# Patient Record
Sex: FEMALE | Race: WHITE | NOT HISPANIC OR LATINO
[De-identification: names, ages, dates, MRNs, and addresses within clinical notes are randomized per-mention and may not be internally consistent; named-entity substitution may affect disease eponyms.]

---

## 2017-05-14 PROBLEM — Z00.00 ENCOUNTER FOR PREVENTIVE HEALTH EXAMINATION: Status: ACTIVE | Noted: 2017-05-14

## 2017-06-12 ENCOUNTER — APPOINTMENT (OUTPATIENT)
Dept: NEUROLOGY | Facility: CLINIC | Age: 82
End: 2017-06-12

## 2017-06-12 VITALS
HEIGHT: 69 IN | BODY MASS INDEX: 22.22 KG/M2 | DIASTOLIC BLOOD PRESSURE: 80 MMHG | HEART RATE: 74 BPM | WEIGHT: 150 LBS | SYSTOLIC BLOOD PRESSURE: 113 MMHG

## 2017-06-12 DIAGNOSIS — E03.9 HYPOTHYROIDISM, UNSPECIFIED: ICD-10-CM

## 2017-06-12 DIAGNOSIS — M54.5 LOW BACK PAIN: ICD-10-CM

## 2017-06-12 DIAGNOSIS — Z82.49 FAMILY HISTORY OF ISCHEMIC HEART DISEASE AND OTHER DISEASES OF THE CIRCULATORY SYSTEM: ICD-10-CM

## 2017-06-12 DIAGNOSIS — G62.9 POLYNEUROPATHY, UNSPECIFIED: ICD-10-CM

## 2017-06-12 DIAGNOSIS — Z78.9 OTHER SPECIFIED HEALTH STATUS: ICD-10-CM

## 2017-06-12 DIAGNOSIS — Z80.9 FAMILY HISTORY OF MALIGNANT NEOPLASM, UNSPECIFIED: ICD-10-CM

## 2017-07-25 ENCOUNTER — EMERGENCY (EMERGENCY)
Facility: HOSPITAL | Age: 82
LOS: 1 days | End: 2017-07-25
Payer: MEDICARE

## 2017-07-25 PROCEDURE — 99284 EMERGENCY DEPT VISIT MOD MDM: CPT | Mod: GC

## 2017-07-28 ENCOUNTER — OUTPATIENT (OUTPATIENT)
Dept: OUTPATIENT SERVICES | Facility: HOSPITAL | Age: 82
LOS: 1 days | End: 2017-07-28

## 2017-08-29 ENCOUNTER — APPOINTMENT (OUTPATIENT)
Dept: NEUROLOGY | Facility: CLINIC | Age: 82
End: 2017-08-29

## 2019-02-12 ENCOUNTER — EMERGENCY (EMERGENCY)
Facility: HOSPITAL | Age: 84
LOS: 1 days | End: 2019-02-12
Admitting: EMERGENCY MEDICINE
Payer: MEDICARE

## 2019-02-12 PROCEDURE — 71045 X-RAY EXAM CHEST 1 VIEW: CPT | Mod: 26

## 2019-02-12 PROCEDURE — 74019 RADEX ABDOMEN 2 VIEWS: CPT | Mod: 26

## 2019-02-12 PROCEDURE — 99285 EMERGENCY DEPT VISIT HI MDM: CPT | Mod: GC

## 2019-02-12 PROCEDURE — 93010 ELECTROCARDIOGRAM REPORT: CPT

## 2019-03-29 ENCOUNTER — EMERGENCY (EMERGENCY)
Facility: HOSPITAL | Age: 84
LOS: 1 days | End: 2019-03-29
Admitting: EMERGENCY MEDICINE
Payer: MEDICARE

## 2019-03-29 PROCEDURE — 70450 CT HEAD/BRAIN W/O DYE: CPT | Mod: 26

## 2019-03-29 PROCEDURE — 93010 ELECTROCARDIOGRAM REPORT: CPT

## 2019-03-29 PROCEDURE — 99285 EMERGENCY DEPT VISIT HI MDM: CPT

## 2019-03-29 PROCEDURE — 71045 X-RAY EXAM CHEST 1 VIEW: CPT | Mod: 26

## 2019-06-02 ENCOUNTER — INPATIENT (INPATIENT)
Facility: HOSPITAL | Age: 84
LOS: 6 days | Discharge: HOME HEALTH | End: 2019-06-09
Attending: INTERNAL MEDICINE | Admitting: INTERNAL MEDICINE
Payer: MEDICARE

## 2019-06-02 VITALS — HEIGHT: 69 IN | WEIGHT: 149.91 LBS

## 2019-06-02 DIAGNOSIS — R74.8 ABNORMAL LEVELS OF OTHER SERUM ENZYMES: ICD-10-CM

## 2019-06-02 DIAGNOSIS — I50.9 HEART FAILURE, UNSPECIFIED: ICD-10-CM

## 2019-06-02 DIAGNOSIS — I10 ESSENTIAL (PRIMARY) HYPERTENSION: ICD-10-CM

## 2019-06-02 DIAGNOSIS — E87.1 HYPO-OSMOLALITY AND HYPONATREMIA: ICD-10-CM

## 2019-06-02 LAB
ALBUMIN SERPL ELPH-MCNC: 3.1 G/DL — LOW (ref 3.3–5)
ALP SERPL-CCNC: 435 U/L — HIGH (ref 40–120)
ALT FLD-CCNC: 39 U/L — SIGNIFICANT CHANGE UP (ref 12–78)
ANION GAP SERPL CALC-SCNC: 9 MMOL/L — SIGNIFICANT CHANGE UP (ref 5–17)
APPEARANCE UR: CLEAR — SIGNIFICANT CHANGE UP
AST SERPL-CCNC: 23 U/L — SIGNIFICANT CHANGE UP (ref 15–37)
BASOPHILS # BLD AUTO: 0 K/UL — SIGNIFICANT CHANGE UP (ref 0–0.2)
BASOPHILS NFR BLD AUTO: 0 % — SIGNIFICANT CHANGE UP (ref 0–2)
BILIRUB SERPL-MCNC: 0.6 MG/DL — SIGNIFICANT CHANGE UP (ref 0.2–1.2)
BILIRUB UR-MCNC: NEGATIVE — SIGNIFICANT CHANGE UP
BUN SERPL-MCNC: 8 MG/DL — SIGNIFICANT CHANGE UP (ref 7–23)
CALCIUM SERPL-MCNC: 8.8 MG/DL — SIGNIFICANT CHANGE UP (ref 8.5–10.1)
CHLORIDE SERPL-SCNC: 85 MMOL/L — LOW (ref 96–108)
CO2 SERPL-SCNC: 29 MMOL/L — SIGNIFICANT CHANGE UP (ref 22–31)
COLOR SPEC: YELLOW — SIGNIFICANT CHANGE UP
CREAT SERPL-MCNC: 0.59 MG/DL — SIGNIFICANT CHANGE UP (ref 0.5–1.3)
DIFF PNL FLD: NEGATIVE — SIGNIFICANT CHANGE UP
EOSINOPHIL # BLD AUTO: 0 K/UL — SIGNIFICANT CHANGE UP (ref 0–0.5)
EOSINOPHIL NFR BLD AUTO: 0 % — SIGNIFICANT CHANGE UP (ref 0–6)
GLUCOSE SERPL-MCNC: 119 MG/DL — HIGH (ref 70–99)
GLUCOSE UR QL: NEGATIVE MG/DL — SIGNIFICANT CHANGE UP
HCT VFR BLD CALC: 37.5 % — SIGNIFICANT CHANGE UP (ref 34.5–45)
HGB BLD-MCNC: 13 G/DL — SIGNIFICANT CHANGE UP (ref 11.5–15.5)
KETONES UR-MCNC: NEGATIVE — SIGNIFICANT CHANGE UP
LEUKOCYTE ESTERASE UR-ACNC: ABNORMAL
LYMPHOCYTES # BLD AUTO: 1.46 K/UL — SIGNIFICANT CHANGE UP (ref 1–3.3)
LYMPHOCYTES # BLD AUTO: 13 % — SIGNIFICANT CHANGE UP (ref 13–44)
MCHC RBC-ENTMCNC: 31.7 PG — SIGNIFICANT CHANGE UP (ref 27–34)
MCHC RBC-ENTMCNC: 34.7 GM/DL — SIGNIFICANT CHANGE UP (ref 32–36)
MCV RBC AUTO: 91.5 FL — SIGNIFICANT CHANGE UP (ref 80–100)
MONOCYTES # BLD AUTO: 0.78 K/UL — SIGNIFICANT CHANGE UP (ref 0–0.9)
MONOCYTES NFR BLD AUTO: 7 % — SIGNIFICANT CHANGE UP (ref 2–14)
NEUTROPHILS # BLD AUTO: 8.97 K/UL — HIGH (ref 1.8–7.4)
NEUTROPHILS NFR BLD AUTO: 80 % — HIGH (ref 43–77)
NITRITE UR-MCNC: NEGATIVE — SIGNIFICANT CHANGE UP
NRBC # BLD: SIGNIFICANT CHANGE UP /100 WBCS (ref 0–0)
NT-PROBNP SERPL-SCNC: 1855 PG/ML — HIGH (ref 0–450)
PH UR: 7 — SIGNIFICANT CHANGE UP (ref 5–8)
PLATELET # BLD AUTO: 593 K/UL — HIGH (ref 150–400)
POTASSIUM SERPL-MCNC: 3.5 MMOL/L — SIGNIFICANT CHANGE UP (ref 3.5–5.3)
POTASSIUM SERPL-SCNC: 3.5 MMOL/L — SIGNIFICANT CHANGE UP (ref 3.5–5.3)
PROT SERPL-MCNC: 7.1 GM/DL — SIGNIFICANT CHANGE UP (ref 6–8.3)
PROT UR-MCNC: NEGATIVE MG/DL — SIGNIFICANT CHANGE UP
RBC # BLD: 4.1 M/UL — SIGNIFICANT CHANGE UP (ref 3.8–5.2)
RBC # FLD: 12.2 % — SIGNIFICANT CHANGE UP (ref 10.3–14.5)
SODIUM SERPL-SCNC: 123 MMOL/L — LOW (ref 135–145)
SP GR SPEC: 1.01 — SIGNIFICANT CHANGE UP (ref 1.01–1.02)
TROPONIN I SERPL-MCNC: 0.03 NG/ML — SIGNIFICANT CHANGE UP (ref 0.01–0.04)
TROPONIN I SERPL-MCNC: 0.04 NG/ML — SIGNIFICANT CHANGE UP (ref 0.01–0.04)
UROBILINOGEN FLD QL: NEGATIVE MG/DL — SIGNIFICANT CHANGE UP
WBC # BLD: 11.21 K/UL — HIGH (ref 3.8–10.5)
WBC # FLD AUTO: 11.21 K/UL — HIGH (ref 3.8–10.5)

## 2019-06-02 PROCEDURE — 71045 X-RAY EXAM CHEST 1 VIEW: CPT | Mod: 26

## 2019-06-02 PROCEDURE — 70450 CT HEAD/BRAIN W/O DYE: CPT | Mod: 26

## 2019-06-02 PROCEDURE — 93010 ELECTROCARDIOGRAM REPORT: CPT

## 2019-06-02 PROCEDURE — 99285 EMERGENCY DEPT VISIT HI MDM: CPT

## 2019-06-02 RX ORDER — HEPARIN SODIUM 5000 [USP'U]/ML
5000 INJECTION INTRAVENOUS; SUBCUTANEOUS EVERY 12 HOURS
Refills: 0 | Status: DISCONTINUED | OUTPATIENT
Start: 2019-06-02 | End: 2019-06-09

## 2019-06-02 RX ORDER — LANOLIN ALCOHOL/MO/W.PET/CERES
5 CREAM (GRAM) TOPICAL AT BEDTIME
Refills: 0 | Status: COMPLETED | OUTPATIENT
Start: 2019-06-02 | End: 2019-06-02

## 2019-06-02 RX ORDER — FUROSEMIDE 40 MG
40 TABLET ORAL ONCE
Refills: 0 | Status: COMPLETED | OUTPATIENT
Start: 2019-06-02 | End: 2019-06-02

## 2019-06-02 RX ORDER — METOPROLOL TARTRATE 50 MG
12.5 TABLET ORAL EVERY 12 HOURS
Refills: 0 | Status: DISCONTINUED | OUTPATIENT
Start: 2019-06-02 | End: 2019-06-02

## 2019-06-02 RX ORDER — FUROSEMIDE 40 MG
40 TABLET ORAL EVERY 12 HOURS
Refills: 0 | Status: DISCONTINUED | OUTPATIENT
Start: 2019-06-02 | End: 2019-06-06

## 2019-06-02 RX ORDER — METOPROLOL TARTRATE 50 MG
25 TABLET ORAL EVERY 8 HOURS
Refills: 0 | Status: DISCONTINUED | OUTPATIENT
Start: 2019-06-02 | End: 2019-06-09

## 2019-06-02 RX ORDER — LISINOPRIL 2.5 MG/1
2.5 TABLET ORAL DAILY
Refills: 0 | Status: DISCONTINUED | OUTPATIENT
Start: 2019-06-02 | End: 2019-06-06

## 2019-06-02 RX ADMIN — Medication 40 MILLIGRAM(S): at 15:03

## 2019-06-02 RX ADMIN — Medication 5 MILLIGRAM(S): at 23:04

## 2019-06-02 RX ADMIN — Medication 25 MILLIGRAM(S): at 21:14

## 2019-06-02 NOTE — ED PROVIDER NOTE - OBJECTIVE STATEMENT
85 y/o F with no reported PMH, states she was recently started on lasix 5 days ago, presents with bilateral LE edema, non-productive cough, CP worse with coughing x months. pt states she was seen at [facility] 3x over the past 2 months for similar symptoms. States she signed out AMA following admission on most recent visit. Pt is accompanied by son who states he found her on the floor in her garage today. Pt confirms she fell into her laundry basket while in the garage. Believes she hit her head on the wall. No LOC. States most recent echo was 1 year ago which is self-reported as normal. States most recent stress test was 85 y/o F with no reported PMH, states she was recently started on lasix 5 days ago, presents with bilateral LE edema, non-productive cough, CP worse with coughing x months. pt states she was seen at [facility] 3x over the past 2 months for similar symptoms. States she signed out AMA following admission on most recent visit. Pt is accompanied by son who states he found her on the floor in her garage today. Pt confirms she tripped & fell into her laundry basket while in the garage. Believes she hit her head on the wall. No LOC. States most recent echo was 1 year ago which is self-reported as normal. States most recent stress test was > 2 years ago which was normal. Denies blurry vision, HA, dizziness, nausea, vomiting, SOB, palpitations, abdominal pain, abdominal distension, recent travel/sick contacts.

## 2019-06-02 NOTE — ED PROVIDER NOTE - CLINICAL SUMMARY MEDICAL DECISION MAKING FREE TEXT BOX
80 y/o F with no reported PMH, recently started on lasix has symptoms consistent with CHF. Also had recent fall today. Will assess Head CT, labs, CXR, EKG, provide lasix. Likely admission. 80 y/o F with no reported PMH, recently started on lasix has symptoms consistent with CHF. Also had recent fall today. Will assess Head CT, labs, CXR, EKG, provide lasix. Likely admission. Elevated Pro-BNP, mild elevation in troponin. Lasix administered. Plan for admission.

## 2019-06-02 NOTE — H&P ADULT - PROBLEM SELECTOR PLAN 1
- strict ins and outs  - daily weights  - lasix 40 mg IVP Q 12 hours  - lopressor 25 mg PO Q 8 hours with holds for SBP <110 or HR < 60bpm  - TTE in am  - Cardiology eval

## 2019-06-02 NOTE — H&P ADULT - HISTORY OF PRESENT ILLNESS
85 y/o female with PMHx of HTN and allergic rhinitis that presents to the ED with complaints of  progressive shortness of breath and lower extremity swelling.  Patient states that these symptoms have been worsening over the last 1 month.  She states that she was seen by a Cardiologist at [facility] and was told that she has "atrial enlargement"  She states that she has been placed on Lasix but has not taken the medication due to urticaria.  Patient denies fevers and chills but does complain of chest pain with dry cough which she has had over the last 3 weeks.  Denies other symptoms.    ED course  - patient found to have elevated BNP with CXR findings significant for left pleural effusion and right infiltrate   BP elevated in ED.  Given 40 lasix IVP

## 2019-06-02 NOTE — ED PROVIDER NOTE - ATTENDING CONTRIBUTION TO CARE
I personally saw the patient with the ACP, and completed the key components of the history and physical exam.  I then discussed the management plan with the ACP.     84F recently started on PO Lasix (?CHF) c/o dry cough, leg swelling.  No chest pain.  Fell this am -- found on floor by son.  Plan: CT head, CXR, labs, lasix, likely admit

## 2019-06-02 NOTE — ED ADULT NURSE NOTE - OBJECTIVE STATEMENT
Pt brought to the ED by son after son found her on the ground in the garage today. Pt states that she has been in [facility] several time over the past couple of months. Pt states that today she was walking in the garage and she believes that she slipped and fell. Pt denies any LOC. Pt admits to hitting the back of her head when she fell. Pt complains of dry cough and chest discomfort that she attributes to the cough. Pt states that she started lasix this week for lower extremity swelling which she says has been working.

## 2019-06-02 NOTE — ED PROVIDER NOTE - CARE PLAN
Principal Discharge DX:	Congestive heart failure, unspecified HF chronicity, unspecified heart failure type  Secondary Diagnosis:	Concussion without loss of consciousness, initial encounter

## 2019-06-02 NOTE — ED ADULT TRIAGE NOTE - CHIEF COMPLAINT QUOTE
c/o cough and chest pain for past 2 weeks, c/o bilateral lower extremities swelling noted, O2 sat in triage 91% on RA, pulse 104

## 2019-06-02 NOTE — PATIENT PROFILE ADULT - STATED REASON FOR ADMISSION
I have been coughing for 3 weeks 1 went to [facility] 3x  and they sent me to an allergist and the spray he gave me didn't help my cough at all. my son found me sitting on floor and said you need to go to hospital.

## 2019-06-02 NOTE — ED ADULT NURSE NOTE - NSFALLRSKHMRSKTYOTFT_ED_ALL_ED
The patient completed a Confidential Clinical Questionnaire.  I sent the original to scanning and held a copy in Psychiatry until scanning complete/confirmed.Jenni Valle/YIMI    
pitting edema; shortness of breath

## 2019-06-02 NOTE — ED ADULT NURSE NOTE - NSIMPLEMENTINTERV_GEN_ALL_ED
Implemented All Fall with Harm Risk Interventions:  [place] to call system. Call bell, personal items and telephone within reach. Instruct patient to call for assistance. Room bathroom lighting operational. Non-slip footwear when patient is off stretcher. Physically safe environment: no spills, clutter or unnecessary equipment. Stretcher in lowest position, wheels locked, appropriate side rails in place. Provide visual cue, wrist band, yellow gown, etc. Monitor gait and stability. Monitor for mental status changes and reorient to person, place, and time. Review medications for side effects contributing to fall risk. Reinforce activity limits and safety measures with patient and family. Provide visual clues: red socks.

## 2019-06-02 NOTE — ED PROVIDER NOTE - PHYSICAL EXAMINATION
GEN: Well appearing, NAD, A&O x3  HEENT: NC/AT, No oropharyngeal edema, erythema, tonsilar enlargement/exudates. TM well visualized bilaterally. GEN: Well appearing, NAD, A&O x3  HEENT: NC/AT, No oropharyngeal edema, erythema, tonsilar enlargement/exudates. TM well visualized bilaterally. No paraspinal or midline C-spine tenderness  Cardiac: s1s2, RRR.  Lungs: decreased breath sounds left base  Abdomen: NBS x4, soft, nontender  MSK: No obvious deformity to spine or extremities. No TTP over T/L spine, upper or lower extremity bony landmarks. PRABHAKAR x4  PV: 1+ pitting edema in bilateral LE over feet extending to ankles. Non pitting edema in bilateral calf up to knees. Cap refill < 2 sec in lower extremities. 1/4 DP/PT pulses bilaterally.

## 2019-06-02 NOTE — ED ADULT NURSE REASSESSMENT NOTE - NS ED NURSE REASSESS COMMENT FT1
Pt resting comfortably in bed with granddaughter at bedside. Pt given menu to choose dinner options. Cardiac monitoring maintained. Pt to be admitted. Comfort and safety maintained.

## 2019-06-02 NOTE — H&P ADULT - NSHPPHYSICALEXAM_GEN_ALL_CORE
T(C): 36.3 (06-02-19 @ 18:02), Max: 36.6 (06-02-19 @ 14:25)  HR: 86 (06-02-19 @ 18:25) (86 - 106)  BP: 124/80 (06-02-19 @ 18:25) (124/80 - 162/111)  RR: 20 (06-02-19 @ 18:25) (20 - 28)  SpO2: 96% (06-02-19 @ 18:25) (92% - 99%)

## 2019-06-03 LAB
ANION GAP SERPL CALC-SCNC: 9 MMOL/L — SIGNIFICANT CHANGE UP (ref 5–17)
BUN SERPL-MCNC: 10 MG/DL — SIGNIFICANT CHANGE UP (ref 7–23)
CALCIUM SERPL-MCNC: 8.6 MG/DL — SIGNIFICANT CHANGE UP (ref 8.5–10.1)
CHLORIDE SERPL-SCNC: 83 MMOL/L — LOW (ref 96–108)
CO2 SERPL-SCNC: 30 MMOL/L — SIGNIFICANT CHANGE UP (ref 22–31)
CREAT SERPL-MCNC: 0.74 MG/DL — SIGNIFICANT CHANGE UP (ref 0.5–1.3)
GLUCOSE SERPL-MCNC: 103 MG/DL — HIGH (ref 70–99)
NT-PROBNP SERPL-SCNC: 1500 PG/ML — HIGH (ref 0–450)
POTASSIUM SERPL-MCNC: 3.4 MMOL/L — LOW (ref 3.5–5.3)
POTASSIUM SERPL-SCNC: 3.4 MMOL/L — LOW (ref 3.5–5.3)
SODIUM SERPL-SCNC: 122 MMOL/L — LOW (ref 135–145)

## 2019-06-03 PROCEDURE — 93306 TTE W/DOPPLER COMPLETE: CPT | Mod: 26

## 2019-06-03 RX ADMIN — HEPARIN SODIUM 5000 UNIT(S): 5000 INJECTION INTRAVENOUS; SUBCUTANEOUS at 18:07

## 2019-06-03 RX ADMIN — Medication 25 MILLIGRAM(S): at 18:08

## 2019-06-03 RX ADMIN — Medication 25 MILLIGRAM(S): at 21:54

## 2019-06-03 RX ADMIN — Medication 40 MILLIGRAM(S): at 18:07

## 2019-06-03 RX ADMIN — LISINOPRIL 2.5 MILLIGRAM(S): 2.5 TABLET ORAL at 05:26

## 2019-06-03 RX ADMIN — Medication 25 MILLIGRAM(S): at 05:26

## 2019-06-03 RX ADMIN — HEPARIN SODIUM 5000 UNIT(S): 5000 INJECTION INTRAVENOUS; SUBCUTANEOUS at 05:26

## 2019-06-03 RX ADMIN — Medication 40 MILLIGRAM(S): at 05:26

## 2019-06-03 NOTE — DIETITIAN INITIAL EVALUATION ADULT. - NS AS NUTRI DX NUTRIENT
Malnutrition/Meets Criteria for moderate protein-calorie malnutrition in the context of chronic illness

## 2019-06-03 NOTE — DIETITIAN INITIAL EVALUATION ADULT. - ORAL INTAKE PTA
Pt states she has had 3 weeks of progressive poor Appetite. Pt states she just wasn't eating much./poor

## 2019-06-03 NOTE — DIETITIAN INITIAL EVALUATION ADULT. - OTHER INFO
Pt seen for CHF diet education and poor Po intake. HTN and allergic rhinitis that presents to the ED with complaints of  progressive shortness of breath and lower extremity swelling. Pt noted with no h/x of CHF. Pt states appetite has been reduced over the past 3 weeks. No difficulty chewing, but felt like she was choking when swallowing. Pt feels better may been 2/2 to fluid in lungs. Pt denies n/v/d/c.  Pt with 2+ edema in legs, gloria 19, no skin break down. given pt's PO intake and NFPE. Pt meets criteria for Moderate protein-calorie malnutrition in the context of chronic illness. Recommend Ensure enlive once a day. Monitor wt and po intake. Will continue to monitor pt's nutritional status. Pt educated on CHF and diet. Pt asked questions and engaged in diet education will f/u as needed.

## 2019-06-03 NOTE — PHYSICAL THERAPY INITIAL EVALUATION ADULT - GAIT DEVIATIONS NOTED, PT EVAL
decreased dionisio/mild limp observed 2/2 neuropathic parasthesias in b/l feet during amb./decreased step length

## 2019-06-03 NOTE — PHYSICAL THERAPY INITIAL EVALUATION ADULT - GENERAL OBSERVATIONS, REHAB EVAL
Pt rec'd supine in bed, pleasant and cooperative with PT, no acute c/o pain, but endorses a "stubborn cough."

## 2019-06-03 NOTE — DIETITIAN INITIAL EVALUATION ADULT. - PHYSICAL APPEARANCE
other (specify)/Pt with mild muscle wasting in temple, clavicle and deltoid. No muscle wastign in calves and quads. Pt with mild fat wasting in triceps and ribs.

## 2019-06-03 NOTE — PHYSICAL THERAPY INITIAL EVALUATION ADULT - STANDING BALANCE: STATIC
Pt called to [facility]. BLS called to transport pt to [facility]. Pt updated with plan of care; no concerns.    fair plus

## 2019-06-03 NOTE — DIETITIAN INITIAL EVALUATION ADULT. - PERTINENT MEDS FT
MEDICATIONS  (STANDING):  furosemide   Injectable 40 milliGRAM(s) IV Push every 12 hours  heparin  Injectable 5000 Unit(s) SubCutaneous every 12 hours  lisinopril 2.5 milliGRAM(s) Oral daily  metoprolol tartrate 25 milliGRAM(s) Oral every 8 hours    MEDICATIONS  (PRN):

## 2019-06-03 NOTE — PHYSICAL THERAPY INITIAL EVALUATION ADULT - LIVES WITH, PROFILE
children/Lives in 2 story home with no MARIZOL, pt stays on ground floor, adult son stays with her every other week.

## 2019-06-03 NOTE — CHART NOTE - NSCHARTNOTEFT_GEN_A_CORE
Upon Nutritional Assessment by the Registered Dietitian your patient was determined to meet criteria / has evidence of the following diagnosis/diagnoses:          [ ]  Mild Protein Calorie Malnutrition        [x]  Moderate Protein Calorie Malnutrition        [ ] Severe Protein Calorie Malnutrition        [ ] Unspecified Protein Calorie Malnutrition        [ ] Underweight / BMI <19        [ ] Morbid Obesity / BMI > 40      Findings as based on:  •  Comprehensive nutrition assessment and consultation  •  Calorie counts (nutrient intake analysis)  •  Food acceptance and intake status from observations by staff  •  Follow up  •  Patient education  •  Intervention secondary to interdisciplinary rounds  •   concerns      Treatment:    The following diet has been recommended:  -Encourage Po intake  -Ensure enlive BID    PROVIDER Section:     By signing this assessment you are acknowledging and agree with the diagnosis/diagnoses assigned by the Registered Dietitian    Comments:

## 2019-06-03 NOTE — DIETITIAN INITIAL EVALUATION ADULT. - ADHERENCE
Pt's diet appears low in sodium. Pt had not been on sodium restriction before and used salt while cooking/good

## 2019-06-03 NOTE — PHYSICAL THERAPY INITIAL EVALUATION ADULT - PERTINENT HX OF CURRENT PROBLEM, REHAB EVAL
85 y/o female with PMHx of HTN and allergic rhinitis that presents to the ED with complaints of  progressive shortness of breath and lower extremity swelling.  Patient states that these symptoms have been worsening over the last 1 month.  She states that she was seen by a Cardiologist at [facility] and was told that she has "atrial enlargement"  She states that she has been placed on Lasix but has not taken the medication due to urticaria.

## 2019-06-04 LAB
ADD ON TEST-SPECIMEN IN LAB: SIGNIFICANT CHANGE UP
ANION GAP SERPL CALC-SCNC: 8 MMOL/L — SIGNIFICANT CHANGE UP (ref 5–17)
BUN SERPL-MCNC: 12 MG/DL — SIGNIFICANT CHANGE UP (ref 7–23)
CALCIUM SERPL-MCNC: 8.1 MG/DL — LOW (ref 8.5–10.1)
CHLORIDE SERPL-SCNC: 81 MMOL/L — LOW (ref 96–108)
CO2 SERPL-SCNC: 32 MMOL/L — HIGH (ref 22–31)
CREAT SERPL-MCNC: 0.55 MG/DL — SIGNIFICANT CHANGE UP (ref 0.5–1.3)
GLUCOSE SERPL-MCNC: 112 MG/DL — HIGH (ref 70–99)
HCT VFR BLD CALC: 36.6 % — SIGNIFICANT CHANGE UP (ref 34.5–45)
HGB BLD-MCNC: 12.6 G/DL — SIGNIFICANT CHANGE UP (ref 11.5–15.5)
MAGNESIUM SERPL-MCNC: 2 MG/DL — SIGNIFICANT CHANGE UP (ref 1.6–2.6)
MCHC RBC-ENTMCNC: 31.7 PG — SIGNIFICANT CHANGE UP (ref 27–34)
MCHC RBC-ENTMCNC: 34.4 GM/DL — SIGNIFICANT CHANGE UP (ref 32–36)
MCV RBC AUTO: 92 FL — SIGNIFICANT CHANGE UP (ref 80–100)
NT-PROBNP SERPL-SCNC: 807 PG/ML — HIGH (ref 0–450)
OSMOLALITY SERPL: 249 MOSM/KG — LOW (ref 289–308)
PLATELET # BLD AUTO: 569 K/UL — HIGH (ref 150–400)
POTASSIUM SERPL-MCNC: 2.9 MMOL/L — CRITICAL LOW (ref 3.5–5.3)
POTASSIUM SERPL-MCNC: 3.7 MMOL/L — SIGNIFICANT CHANGE UP (ref 3.5–5.3)
POTASSIUM SERPL-SCNC: 2.9 MMOL/L — CRITICAL LOW (ref 3.5–5.3)
POTASSIUM SERPL-SCNC: 3.7 MMOL/L — SIGNIFICANT CHANGE UP (ref 3.5–5.3)
RBC # BLD: 3.98 M/UL — SIGNIFICANT CHANGE UP (ref 3.8–5.2)
RBC # FLD: 12.2 % — SIGNIFICANT CHANGE UP (ref 10.3–14.5)
SODIUM SERPL-SCNC: 121 MMOL/L — LOW (ref 135–145)
WBC # BLD: 9.62 K/UL — SIGNIFICANT CHANGE UP (ref 3.8–10.5)
WBC # FLD AUTO: 9.62 K/UL — SIGNIFICANT CHANGE UP (ref 3.8–10.5)

## 2019-06-04 PROCEDURE — 71250 CT THORAX DX C-: CPT | Mod: 26

## 2019-06-04 RX ORDER — POTASSIUM CHLORIDE 20 MEQ
10 PACKET (EA) ORAL
Refills: 0 | Status: COMPLETED | OUTPATIENT
Start: 2019-06-04 | End: 2019-06-04

## 2019-06-04 RX ORDER — CEFTRIAXONE 500 MG/1
1000 INJECTION, POWDER, FOR SOLUTION INTRAMUSCULAR; INTRAVENOUS EVERY 24 HOURS
Refills: 0 | Status: DISCONTINUED | OUTPATIENT
Start: 2019-06-05 | End: 2019-06-09

## 2019-06-04 RX ORDER — CEFTRIAXONE 500 MG/1
1000 INJECTION, POWDER, FOR SOLUTION INTRAMUSCULAR; INTRAVENOUS ONCE
Refills: 0 | Status: COMPLETED | OUTPATIENT
Start: 2019-06-04 | End: 2019-06-04

## 2019-06-04 RX ORDER — CEFTRIAXONE 500 MG/1
INJECTION, POWDER, FOR SOLUTION INTRAMUSCULAR; INTRAVENOUS
Refills: 0 | Status: DISCONTINUED | OUTPATIENT
Start: 2019-06-04 | End: 2019-06-04

## 2019-06-04 RX ORDER — AZITHROMYCIN 500 MG/1
TABLET, FILM COATED ORAL
Refills: 0 | Status: DISCONTINUED | OUTPATIENT
Start: 2019-06-04 | End: 2019-06-09

## 2019-06-04 RX ORDER — CEFTRIAXONE 500 MG/1
INJECTION, POWDER, FOR SOLUTION INTRAMUSCULAR; INTRAVENOUS
Refills: 0 | Status: DISCONTINUED | OUTPATIENT
Start: 2019-06-04 | End: 2019-06-09

## 2019-06-04 RX ORDER — AZITHROMYCIN 500 MG/1
500 TABLET, FILM COATED ORAL EVERY 24 HOURS
Refills: 0 | Status: DISCONTINUED | OUTPATIENT
Start: 2019-06-05 | End: 2019-06-09

## 2019-06-04 RX ORDER — IPRATROPIUM/ALBUTEROL SULFATE 18-103MCG
3 AEROSOL WITH ADAPTER (GRAM) INHALATION EVERY 6 HOURS
Refills: 0 | Status: COMPLETED | OUTPATIENT
Start: 2019-06-04 | End: 2019-06-05

## 2019-06-04 RX ORDER — TRAMADOL HYDROCHLORIDE 50 MG/1
25 TABLET ORAL ONCE
Refills: 0 | Status: DISCONTINUED | OUTPATIENT
Start: 2019-06-04 | End: 2019-06-04

## 2019-06-04 RX ORDER — POTASSIUM CHLORIDE 20 MEQ
40 PACKET (EA) ORAL ONCE
Refills: 0 | Status: COMPLETED | OUTPATIENT
Start: 2019-06-04 | End: 2019-06-04

## 2019-06-04 RX ORDER — AZITHROMYCIN 500 MG/1
500 TABLET, FILM COATED ORAL ONCE
Refills: 0 | Status: COMPLETED | OUTPATIENT
Start: 2019-06-04 | End: 2019-06-04

## 2019-06-04 RX ADMIN — TRAMADOL HYDROCHLORIDE 25 MILLIGRAM(S): 50 TABLET ORAL at 01:43

## 2019-06-04 RX ADMIN — Medication 40 MILLIEQUIVALENT(S): at 08:37

## 2019-06-04 RX ADMIN — Medication 1200 MILLIGRAM(S): at 14:57

## 2019-06-04 RX ADMIN — Medication 25 MILLIGRAM(S): at 13:50

## 2019-06-04 RX ADMIN — Medication 40 MILLIGRAM(S): at 17:26

## 2019-06-04 RX ADMIN — Medication 25 MILLIGRAM(S): at 05:10

## 2019-06-04 RX ADMIN — Medication 25 MILLIGRAM(S): at 21:33

## 2019-06-04 RX ADMIN — AZITHROMYCIN 255 MILLIGRAM(S): 500 TABLET, FILM COATED ORAL at 18:47

## 2019-06-04 RX ADMIN — HEPARIN SODIUM 5000 UNIT(S): 5000 INJECTION INTRAVENOUS; SUBCUTANEOUS at 17:26

## 2019-06-04 RX ADMIN — Medication 40 MILLIGRAM(S): at 05:10

## 2019-06-04 RX ADMIN — Medication 3 MILLILITER(S): at 20:02

## 2019-06-04 RX ADMIN — LISINOPRIL 2.5 MILLIGRAM(S): 2.5 TABLET ORAL at 05:10

## 2019-06-04 RX ADMIN — Medication 100 MILLIEQUIVALENT(S): at 10:17

## 2019-06-04 RX ADMIN — CEFTRIAXONE 1000 MILLIGRAM(S): 500 INJECTION, POWDER, FOR SOLUTION INTRAMUSCULAR; INTRAVENOUS at 18:11

## 2019-06-04 RX ADMIN — HEPARIN SODIUM 5000 UNIT(S): 5000 INJECTION INTRAVENOUS; SUBCUTANEOUS at 05:11

## 2019-06-04 RX ADMIN — Medication 100 MILLIEQUIVALENT(S): at 09:00

## 2019-06-04 NOTE — CHART NOTE - NSCHARTNOTEFT_GEN_A_CORE
Called by RN as pt with c/o headache and neck pain. Pt was seen and examined at bedside. Pt reports pain in the back of the head and pain of the lateral neck regions. Pt reports falling in her garage ~2 days ago and hitting the back of her head on the garage door, pt believes that her current pain in head and neck is secondary to it. Pt denies radiation of pain from neck area, denies visual changes or nausea. Pt states she's sore all over. CT head on admission without reported acute abnormality.    HEENT: PERRL, EOMI, head atraumatic  Neck: supple, free ROM      - Tramadol 25mg x1 dose given  - Consider repeat CT head if headache sxs persists.

## 2019-06-05 LAB
ADD ON TEST-SPECIMEN IN LAB: SIGNIFICANT CHANGE UP
ALBUMIN SERPL ELPH-MCNC: 2.5 G/DL — LOW (ref 3.3–5)
ALP SERPL-CCNC: 272 U/L — HIGH (ref 40–120)
ALT FLD-CCNC: 25 U/L — SIGNIFICANT CHANGE UP (ref 12–78)
ANION GAP SERPL CALC-SCNC: 7 MMOL/L — SIGNIFICANT CHANGE UP (ref 5–17)
AST SERPL-CCNC: 15 U/L — SIGNIFICANT CHANGE UP (ref 15–37)
BILIRUB SERPL-MCNC: 0.5 MG/DL — SIGNIFICANT CHANGE UP (ref 0.2–1.2)
BUN SERPL-MCNC: 12 MG/DL — SIGNIFICANT CHANGE UP (ref 7–23)
CALCIUM SERPL-MCNC: 8.3 MG/DL — LOW (ref 8.5–10.1)
CHLORIDE SERPL-SCNC: 83 MMOL/L — LOW (ref 96–108)
CO2 SERPL-SCNC: 32 MMOL/L — HIGH (ref 22–31)
CREAT ?TM UR-MCNC: 98 MG/DL — SIGNIFICANT CHANGE UP
CREAT SERPL-MCNC: 0.5 MG/DL — SIGNIFICANT CHANGE UP (ref 0.5–1.3)
GLUCOSE SERPL-MCNC: 112 MG/DL — HIGH (ref 70–99)
HCT VFR BLD CALC: 34.4 % — LOW (ref 34.5–45)
HGB BLD-MCNC: 12 G/DL — SIGNIFICANT CHANGE UP (ref 11.5–15.5)
MCHC RBC-ENTMCNC: 31.7 PG — SIGNIFICANT CHANGE UP (ref 27–34)
MCHC RBC-ENTMCNC: 34.9 GM/DL — SIGNIFICANT CHANGE UP (ref 32–36)
MCV RBC AUTO: 91 FL — SIGNIFICANT CHANGE UP (ref 80–100)
OSMOLALITY SERPL: 250 MOSM/KG — LOW (ref 289–308)
OSMOLALITY UR: 381 MOSM/KG — SIGNIFICANT CHANGE UP (ref 50–1200)
PLATELET # BLD AUTO: 478 K/UL — HIGH (ref 150–400)
POTASSIUM SERPL-MCNC: 3.8 MMOL/L — SIGNIFICANT CHANGE UP (ref 3.5–5.3)
POTASSIUM SERPL-SCNC: 3.8 MMOL/L — SIGNIFICANT CHANGE UP (ref 3.5–5.3)
PROT SERPL-MCNC: 5.8 GM/DL — LOW (ref 6–8.3)
RBC # BLD: 3.78 M/UL — LOW (ref 3.8–5.2)
RBC # FLD: 12.3 % — SIGNIFICANT CHANGE UP (ref 10.3–14.5)
SODIUM SERPL-SCNC: 122 MMOL/L — LOW (ref 135–145)
SODIUM UR-SCNC: 20 MMOL/L — SIGNIFICANT CHANGE UP
TSH SERPL-MCNC: 2.31 UU/ML — SIGNIFICANT CHANGE UP (ref 0.34–4.82)
URATE SERPL-MCNC: 3.4 MG/DL — SIGNIFICANT CHANGE UP (ref 2.5–7)
WBC # BLD: 8.99 K/UL — SIGNIFICANT CHANGE UP (ref 3.8–10.5)
WBC # FLD AUTO: 8.99 K/UL — SIGNIFICANT CHANGE UP (ref 3.8–10.5)

## 2019-06-05 PROCEDURE — 74018 RADEX ABDOMEN 1 VIEW: CPT | Mod: 26

## 2019-06-05 RX ORDER — POLYETHYLENE GLYCOL 3350 17 G/17G
17 POWDER, FOR SOLUTION ORAL EVERY 12 HOURS
Refills: 0 | Status: COMPLETED | OUTPATIENT
Start: 2019-06-05 | End: 2019-06-07

## 2019-06-05 RX ORDER — MAGNESIUM HYDROXIDE 400 MG/1
30 TABLET, CHEWABLE ORAL DAILY
Refills: 0 | Status: DISCONTINUED | OUTPATIENT
Start: 2019-06-05 | End: 2019-06-09

## 2019-06-05 RX ADMIN — Medication 25 MILLIGRAM(S): at 13:59

## 2019-06-05 RX ADMIN — Medication 3 MILLILITER(S): at 13:55

## 2019-06-05 RX ADMIN — HEPARIN SODIUM 5000 UNIT(S): 5000 INJECTION INTRAVENOUS; SUBCUTANEOUS at 17:16

## 2019-06-05 RX ADMIN — CEFTRIAXONE 1000 MILLIGRAM(S): 500 INJECTION, POWDER, FOR SOLUTION INTRAMUSCULAR; INTRAVENOUS at 17:16

## 2019-06-05 RX ADMIN — POLYETHYLENE GLYCOL 3350 17 GRAM(S): 17 POWDER, FOR SOLUTION ORAL at 13:59

## 2019-06-05 RX ADMIN — HEPARIN SODIUM 5000 UNIT(S): 5000 INJECTION INTRAVENOUS; SUBCUTANEOUS at 05:35

## 2019-06-05 RX ADMIN — Medication 40 MILLIGRAM(S): at 17:16

## 2019-06-05 RX ADMIN — Medication 1200 MILLIGRAM(S): at 05:35

## 2019-06-05 RX ADMIN — Medication 3 MILLILITER(S): at 08:05

## 2019-06-05 RX ADMIN — AZITHROMYCIN 255 MILLIGRAM(S): 500 TABLET, FILM COATED ORAL at 17:15

## 2019-06-05 RX ADMIN — Medication 1200 MILLIGRAM(S): at 17:16

## 2019-06-05 RX ADMIN — Medication 25 MILLIGRAM(S): at 22:24

## 2019-06-05 NOTE — CONSULT NOTE ADULT - ASSESSMENT
PROBLEMS:    Congestive heart failure  Cough due to CAP and CHF  Bilateral pleural effusions and pericardial effusion and compressive atelectatic changes in the lower lobes and the   inferior upper lobes- Possible pneumonia in the lower lobes   HTN  Chronic Hyponatremia       PLAN:    IV  Ceftriaxone/Azithro   keep neg balance  fu pleural effusion by repeating CXR  supportive care  aerosols  dvt prophylasix
1. SOB and pedal edema- hypervolemic - Acute on chronic decompensated HF- NYHA class II- III  Check 2 D echo.  So far cardiac enzymes did not reveal any ischemia.   continue diuresis with lasix iv.  Diuresis with close monitoring of the renal function and electrolytes.  Goal potassium of 4 and magnesium of 2.   Strict I/O and daily wt checks. Low sodium diet. Nutrition education.     2. HTN- Bp optimal this am.  Continue current meds.    3. Hyponatremia- could be combination of volume overload and decreased po intake.    4. Elevated alkaline phosphatase- imaging of liver is recommended.  Could be from hepatic congestion as well but the elevation is marked.     5. Pleural effusion- monitor with diuresis and if there is no reduction in size toward end of diuresis     Other medical issues- Management per primary team.   Thank you for allowing me to participate in the care of this patient. Please feel free to contact me with any questions.
83 yo female with hx of hyponatremia presumed per pt x years, baseline unknown , presenting with Increased cough, LE edema     Acute vs chronic hyponatremia    previous baseline unknown as pt lives in [facility] - follows with PCP in [place] - try to obtain labs    suspected ADH induced vs hypervolemia ( pt drinks 8- 10 glasses of water per day )    lung pathology ( pna could be exacerbating her chronic state)   hx of hypothyroidism but stopped her own meds - check TSH , cortisol levels    serum uric acid   urine lytes  prior to lasix dose    free water restriction 1L/day    advised pt needs to fup with nephro closer to home after DC to monitor Na levels outpt      d/w RN     Thank you for the courtesy of this consult. We will follow this patient with you.   Management is subject to change if new information becomes available or patient condition changes.

## 2019-06-05 NOTE — CONSULT NOTE ADULT - SUBJECTIVE AND OBJECTIVE BOX
HPI:    84 y.o.f with PMHx of HTN and allergic rhinitis admitted with progressive shortness of breath and lower extremity swelling.  Patient denies fevers and chills but does complain of chest pain with dry cough which she has had over the last 3 weeks.  Denies other symptoms. In ED patient found to have elevated BNP with CXR findings significant for left pleural effusion and right infiltrate, pat asked to see for pulmonary eval.    PAST MEDICAL & SURGICAL HISTORY:  No pertinent past medical history  No significant past surgical history      Home Medications:  aspirin 325 mg oral tablet: 1 tab(s) orally once a day, As Needed pain (03 Jun 2019 11:24)  Melatonin 3 mg oral tablet: 1 tab(s) orally once (at bedtime), As Needed (03 Jun 2019 11:24)      MEDICATIONS  (STANDING):  ALBUTerol/ipratropium for Nebulization 3 milliLiter(s) Nebulizer every 6 hours  azithromycin  IVPB      azithromycin  IVPB 500 milliGRAM(s) IV Intermittent every 24 hours  cefTRIAXone Injectable. 1000 milliGRAM(s) IV Push every 24 hours  cefTRIAXone Injectable.      furosemide   Injectable 40 milliGRAM(s) IV Push every 12 hours  guaiFENesin ER 1200 milliGRAM(s) Oral every 12 hours  heparin  Injectable 5000 Unit(s) SubCutaneous every 12 hours  lisinopril 2.5 milliGRAM(s) Oral daily  metoprolol tartrate 25 milliGRAM(s) Oral every 8 hours    MEDICATIONS  (PRN):  HYDROcodone/homatropine Syrup 5 milliLiter(s) Oral every 6 hours PRN Cough      Allergies    No Known Allergies    Intolerances        SOCIAL HISTORY: Denies tobacco, etoh abuse or illicit drug use    FAMILY HISTORY:  No pertinent family history in first degree relatives      Vital Signs Last 24 Hrs  T(C): 36.8 (05 Jun 2019 05:07), Max: 36.8 (05 Jun 2019 05:07)  T(F): 98.3 (05 Jun 2019 05:07), Max: 98.3 (05 Jun 2019 05:07)  HR: 67 (05 Jun 2019 08:05) (53 - 84)  BP: 107/60 (05 Jun 2019 05:07) (101/50 - 124/61)  BP(mean): --  RR: 18 (05 Jun 2019 05:07) (18 - 18)  SpO2: 93% (05 Jun 2019 05:07) (93% - 96%)        REVIEW OF SYSTEMS:    CONSTITUTIONAL:  As per HPI.  HEENT:  Eyes:  No diplopia or blurred vision. ENT:  No earache, sore throat or runny nose.  CARDIOVASCULAR:  No pressure, squeezing, tightness, heaviness or aching about the chest, neck, axilla or epigastrium.  RESPIRATORY:  +cough, +hortness of breath, PND or orthopnea.  GASTROINTESTINAL:  No nausea, vomiting or diarrhea.  GENITOURINARY:  No dysuria, frequency or urgency.  MUSCULOSKELETAL:  As per HPI.  SKIN:  No change in skin, hair or nails.  NEUROLOGIC:  No paresthesias, fasciculations, seizures or weakness.  PSYCHIATRIC:  No disorder of thought or mood.  ENDOCRINE:  No heat or cold intolerance, polyuria or polydipsia.  HEMATOLOGICAL:  No easy bruising or bleedings:  .     PHYSICAL EXAMINATION:    GENERAL APPEARANCE:  Pt. is not currently dyspneic, in no distress. Pt. is alert, oriented, and pleasant.  HEENT:  Pupils are normal and react normally. No icterus. Mucous membranes well colored.  NECK:  Supple. No lymphadenopathy. Jugular venous pressure not elevated. Carotids equal.   HEART:   The cardiac impulse has a normal quality. Regular. Normal S1 and S2. There are no murmurs, rubs or gallops noted  CHEST:  Chest cracklesultation. Normal respiratory effort.  ABDOMEN:  Soft and nontender.   EXTREMITIES:  There is no cyanosis, clubbing or edema.   SKIN:  No rash or significant lesions are noted.    LABS:                        12.0   8.99  )-----------( 478      ( 05 Jun 2019 07:20 )             34.4     06-05    122<L>  |  83<L>  |  12  ----------------------------<  112<H>  3.8   |  32<H>  |  0.50    Ca    8.3<L>      05 Jun 2019 07:20  Mg     2.0     06-04    TPro  5.8<L>  /  Alb  2.5<L>  /  TBili  0.5  /  DBili  x   /  AST  15  /  ALT  25  /  AlkPhos  272<H>  06-05    LIVER FUNCTIONS - ( 05 Jun 2019 07:20 )  Alb: 2.5 g/dL / Pro: 5.8 gm/dL / ALK PHOS: 272 U/L / ALT: 25 U/L / AST: 15 U/L / GGT: x           RADIOLOGY & ADDITIONAL STUDIES:     CT Chest No Cont (06.04.19 @ 15:36) >  IMPRESSION:   Interval development of bilateral pleural effusions and pericardial   effusion and compressive atelectatic changes in the lower lobes and the   inferior upper lobes. Underlying pneumonia in the lower lobes cannot be   excluded.  Aneurysmal dilatation of the ascending thoracic aorta is unchanged.   Other findings as above are unchanged.
HPI: 85 y/o female with PMHx of HTN and allergic rhinitis that presents to the ED with complaints of  progressive shortness of breath and lower extremity swelling.   She states that she was seen by a Cardiologist at [facility] and was told that she has "atrial enlargement"  She states that she has been placed on Lasix but has not taken the medication due to urticaria.  Patient denies fevers and chills but does complain of chest pain with dry cough which she has had over the last 3 weeks.  Denies other symptoms.  pt states she has long hx of hyponatremia - Na 121- 122 pt does not know baseline    follows with PCP in [place]   CXR reviewed with possible infiltrates. CT chest noted. Started abx.     today   c.o cough   non smoker     PAST MEDICAL & SURGICAL HISTORY:  HTN  Rhinitis    Home Medications:  aspirin 325 mg oral tablet: 1 tab(s) orally once a day, As Needed pain (03 Jun 2019 11:24)  Melatonin 3 mg oral tablet: 1 tab(s) orally once (at bedtime), As Needed (03 Jun 2019 11:24)      MEDICATIONS  (STANDING):  ALBUTerol/ipratropium for Nebulization 3 milliLiter(s) Nebulizer every 6 hours  azithromycin  IVPB      azithromycin  IVPB 500 milliGRAM(s) IV Intermittent every 24 hours  cefTRIAXone Injectable. 1000 milliGRAM(s) IV Push every 24 hours  cefTRIAXone Injectable.      furosemide   Injectable 40 milliGRAM(s) IV Push every 12 hours  guaiFENesin ER 1200 milliGRAM(s) Oral every 12 hours  heparin  Injectable 5000 Unit(s) SubCutaneous every 12 hours  lisinopril 2.5 milliGRAM(s) Oral daily  metoprolol tartrate 25 milliGRAM(s) Oral every 8 hours      Allergies    No Known Allergies    Intolerances        SOCIAL HISTORY:  Denies ETOh,Smoking,     FAMILY HISTORY:  No pertinent family history in first degree relatives      REVIEW OF SYSTEMS:    CONSTITUTIONAL: No weakness, fevers or chills  EYES/ENT: No visual changes;  No vertigo or throat pain   NECK: No pain or stiffness  RESPIRATORY: No wheezing, hemoptysis; No shortness of breath  CARDIOVASCULAR: No chest pain or palpitations  GASTROINTESTINAL: No abdominal or epigastric pain. No nausea, vomiting, or hematemesis; No diarrhea or constipation. No melena or hematochezia.  GENITOURINARY: No dysuria, frequency or hematuria  NEUROLOGICAL: No numbness or weakness  SKIN: No itching, burning, rashes, or lesions   All other review of systems is negative unless indicated above.    VITAL:  T(C): , Max: 36.8 (06-05-19 @ 05:07)  T(F): , Max: 98.3 (06-05-19 @ 05:07)  HR: 88 (06-05-19 @ 11:20)  BP: 131/92 (06-05-19 @ 11:20)  BP(mean): --  RR: 18 (06-05-19 @ 11:20)  SpO2: 94% (06-05-19 @ 11:20)  Wt(kg): --    I and O's:        PHYSICAL EXAM:    Constitutional: NAD  HEENT:  EOMI,  MMM  Neck: No LAD, No JVD  Respiratory: poor AE bilat   Cardiovascular: S1 and S2  Gastrointestinal: BS+, soft, NT/ND  Extremities:  peripheral edema trace +  Neurological: A/O x 3, no focal deficits  Psychiatric: Normal mood, normal affect  : No [place]  Skin: No rashes  Access: Not applicable    LABS:               122    |  83     |  12     ----------------------------<  112       05 Jun 2019 07:20  3.8     |  32     |  0.50     x      |  x      |  x      ----------------------------<  x         04 Jun 2019 14:39  3.7     |  x      |  x        121    |  81     |  12     ----------------------------<  112       04 Jun 2019 07:13  2.9     |  32     |  0.55     Ca    8.3        05 Jun 2019 07:20  Ca    8.1        04 Jun 2019 07:13      Mg     2.0       04 Jun 2019 07:13    TPro  5.8    /  Alb  2.5    /  TBili  0.5    /        05 Jun 2019 07:20  DBili  x      /  AST  15     /  ALT  25     /  AlkPhos  272      TPro  7.1    /  Alb  3.1    /  TBili  0.6    /        02 Jun 2019 14:32  DBili  x      /  AST  23     /  ALT  39     /  AlkPhos  435            Urine Studies:    Urinalysis (06.02.19 @ 16:09)    Glucose Qualitative, Urine: Negative mg/dL    Blood, Urine: Negative    pH Urine: 7.0    Color: Yellow    Urine Appearance: Clear    Bilirubin: Negative    Ketone - Urine: Negative    Specific Gravity: 1.010    Protein, Urine: Negative mg/dL    Urobilinogen: Negative mg/dL    Nitrite: Negative    Leukocyte Esterase Concentration: Trace          RADIOLOGY & ADDITIONAL STUDIES:
Patient is a 84y old  Female who presents with a chief complaint of Shortness of breath and cough.      HPI:  83 y/o female with PMHx of HTN and allergic rhinitis that presents to the ED with complaints of  progressive shortness of breath and lower extremity swelling.  Patient states that these symptoms have been worsening over the last 1 month.  She states that she was seen by a Cardiologist at [facility] and was told that she has "aortic enlargement"  She states that she has been placed on Lasix but has not taken the medication due to urticaria.  Patient denies fevers and chills but does complain of chest pain with dry cough which she has had over the last 3 weeks.  Denies other symptoms.    ED course  - patient found to have elevated BNP with CXR findings significant for left pleural effusion and right infiltrate   BP elevated in ED.  Given 40 lasix IVP     6/3- pt seen and examined this am and she still c/o dry cough.  c/o pedal edema.   Denies any history of CHF in the past.       PAST MEDICAL & SURGICAL HISTORY:  No pertinent past medical history  No significant past surgical history      MEDICATIONS  (STANDING):  furosemide   Injectable 40 milliGRAM(s) IV Push every 12 hours  heparin  Injectable 5000 Unit(s) SubCutaneous every 12 hours  lisinopril 2.5 milliGRAM(s) Oral daily  metoprolol tartrate 25 milliGRAM(s) Oral every 8 hours    MEDICATIONS  (PRN):      FAMILY HISTORY:  No pertinent family history in first degree relatives      SOCIAL HISTORY:  no recent smoking     REVIEW OF SYSTEMS:  CONSTITUTIONAL:    No fatigue, malaise, lethargy.  No fever or chills.  HEENT:  Eyes:  No visual changes.     ENT:  No epistaxis.  No sinus pain.    RESPIRATORY:  c/o cough.  No wheeze.  No hemoptysis.  no shortness of breath.  CARDIOVASCULAR:  No chest pains.  No palpitations. No shortness of breath, No orthopnea or PND. c/o pedal edema  GASTROINTESTINAL:  No abdominal pain.  No nausea or vomiting.    GENITOURINARY:    No hematuria.    MUSCULOSKELETAL:  No musculoskeletal pain.  No joint swelling.  No arthritis.  NEUROLOGICAL:  No tingling or numbness or weakness.  PSYCHIATRIC:  No confusion  SKIN:  No rashes.    ENDOCRINE:  No unexplained weight loss.  No polydipsia.   HEMATOLOGIC:  No anemia.  No prolonged or excessive bleeding.   ALLERGIC AND IMMUNOLOGIC:  No pruritus.          Vital Signs Last 24 Hrs  T(C): 36.8 (2019 04:38), Max: 36.8 (2019 04:38)  T(F): 98.2 (2019 04:38), Max: 98.2 (2019 04:38)  HR: 81 (2019 04:38) (81 - 106)  BP: 125/64 (2019 04:38) (124/80 - 162/111)  BP(mean): --  RR: 18 (2019 04:38) (18 - 28)  SpO2: 100% (2019 04:38) (86% - 100%)    PHYSICAL EXAM-    Constitutional: no acute distress  elderly female     Head: Head is normocephalic and atraumatic.      Neck: positive hepatojugular reflux    Cardiovascular: Regular rate and rhythm without S3, S4. No murmurs or rubs are appreciated.      Respiratory: Breath sounds decreased at left base    Abdomen: Soft, nontender, nondistended with positive bowel sounds.      Extremity: No tenderness. 2+  pitting pedal edema  b/l in LE, chronic skin changes    Neurologic: The patient is alert and oriented.      Skin: No rash, no obvious lesions noted.      Psychiatric: The patient appears to be emotionally stable.      INTERPRETATION OF TELEMETRY:     ECG: Sinus rythm , L axis, PACs and PVCs isolated.    I&O's Detail    2019 07:01  -  2019 07:00  --------------------------------------------------------  IN:  Total IN: 0 mL    OUT:    Voided: 1400 mL  Total OUT: 1400 mL    Total NET: -1400 mL          LABS:                        13.0   11.21 )-----------( 593      ( 2019 14:32 )             37.5     06-02    123<L>  |  85<L>  |  8   ----------------------------<  119<H>  3.5   |  29  |  0.59    Ca    8.8      2019 14:32    TPro  7.1  /  Alb  3.1<L>  /  TBili  0.6  /  DBili  x   /  AST  23  /  ALT  39  /  AlkPhos  435<H>      CARDIAC MARKERS ( 2019 18:13 )  0.039 ng/mL / x     / x     / x     / x      CARDIAC MARKERS ( 2019 14:32 )  0.031 ng/mL / x     / x     / x     / x            Urinalysis Basic - ( 2019 16:09 )    Color: Yellow / Appearance: Clear / S.010 / pH: x  Gluc: x / Ketone: Negative  / Bili: Negative / Urobili: Negative mg/dL   Blood: x / Protein: Negative mg/dL / Nitrite: Negative   Leuk Esterase: Trace / RBC: Negative /HPF / WBC 0-2   Sq Epi: x / Non Sq Epi: Negative / Bacteria: Occasional      I&O's Summary    2019 07:01  -  2019 07:00  --------------------------------------------------------  IN: 0 mL / OUT: 1400 mL / NET: -1400 mL      BNPSerum Pro-Brain Natriuretic Peptide: 1855 pg/mL ( @ 14:32)    RADIOLOGY & ADDITIONAL STUDIES:  < from: Xray Chest 1 View-PORTABLE IMMEDIATE (19 @ 15:16) >    EXAM:  XR CHEST PORTABLE IMMED 1V                            PROCEDURE DATE:  2019          INTERPRETATION:  AP semierect chest on 2019 at 3:10 PM. Patient   has cough and chest pain for 2 weeks.    COMPARISON: None available.    Heart size cannot be assessed.    There is a moderate left effusion.    There is a small amount a right base infiltrate.    IMPRESSION: Bibasilar chest findings as above.                TREVOR LIRA   This document has been electronically signed. 2019  3:40PM              < end of copied text >

## 2019-06-05 NOTE — [facility]I QUERY NOTE - [id number]_GEN_ALL_CORE_HH
Clinical documentation indicates that this patient has Congestive heart failure, unspecified HF chronicity, unspecified heart failure type.   Acute on chronic decompensated HF has been documented    Echo reveals EF= 60-65 %  IV lasix 40mg q12h    Please clarify the type of Acute on chronic decompensated HF-  a) Diastolic (HFpEF  b) Systolic (HFrEF)  c) Combined Systolic (HFrEF)  & Diastolic (HFpEF)  d) Other (specify)

## 2019-06-06 LAB
ALBUMIN SERPL ELPH-MCNC: 2.7 G/DL — LOW (ref 3.3–5)
ANION GAP SERPL CALC-SCNC: 7 MMOL/L — SIGNIFICANT CHANGE UP (ref 5–17)
ANION GAP SERPL CALC-SCNC: 9 MMOL/L — SIGNIFICANT CHANGE UP (ref 5–17)
ANION GAP SERPL CALC-SCNC: 9 MMOL/L — SIGNIFICANT CHANGE UP (ref 5–17)
BUN SERPL-MCNC: 8 MG/DL — SIGNIFICANT CHANGE UP (ref 7–23)
BUN SERPL-MCNC: 8 MG/DL — SIGNIFICANT CHANGE UP (ref 7–23)
BUN SERPL-MCNC: 9 MG/DL — SIGNIFICANT CHANGE UP (ref 7–23)
CALCIUM SERPL-MCNC: 8.1 MG/DL — LOW (ref 8.5–10.1)
CALCIUM SERPL-MCNC: 8.4 MG/DL — LOW (ref 8.5–10.1)
CALCIUM SERPL-MCNC: 8.9 MG/DL — SIGNIFICANT CHANGE UP (ref 8.5–10.1)
CHLORIDE SERPL-SCNC: 78 MMOL/L — LOW (ref 96–108)
CHLORIDE SERPL-SCNC: 80 MMOL/L — LOW (ref 96–108)
CHLORIDE SERPL-SCNC: 81 MMOL/L — LOW (ref 96–108)
CO2 SERPL-SCNC: 30 MMOL/L — SIGNIFICANT CHANGE UP (ref 22–31)
CO2 SERPL-SCNC: 30 MMOL/L — SIGNIFICANT CHANGE UP (ref 22–31)
CO2 SERPL-SCNC: 33 MMOL/L — HIGH (ref 22–31)
CORTIS AM PEAK SERPL-MCNC: 21.5 UG/DL — HIGH (ref 6–18.4)
CREAT SERPL-MCNC: 0.52 MG/DL — SIGNIFICANT CHANGE UP (ref 0.5–1.3)
CREAT SERPL-MCNC: 0.53 MG/DL — SIGNIFICANT CHANGE UP (ref 0.5–1.3)
CREAT SERPL-MCNC: 0.57 MG/DL — SIGNIFICANT CHANGE UP (ref 0.5–1.3)
GLUCOSE SERPL-MCNC: 103 MG/DL — HIGH (ref 70–99)
GLUCOSE SERPL-MCNC: 115 MG/DL — HIGH (ref 70–99)
GLUCOSE SERPL-MCNC: 116 MG/DL — HIGH (ref 70–99)
HCT VFR BLD CALC: 36.7 % — SIGNIFICANT CHANGE UP (ref 34.5–45)
HGB BLD-MCNC: 12.9 G/DL — SIGNIFICANT CHANGE UP (ref 11.5–15.5)
MCHC RBC-ENTMCNC: 31.8 PG — SIGNIFICANT CHANGE UP (ref 27–34)
MCHC RBC-ENTMCNC: 35.1 GM/DL — SIGNIFICANT CHANGE UP (ref 32–36)
MCV RBC AUTO: 90.4 FL — SIGNIFICANT CHANGE UP (ref 80–100)
PHOSPHATE SERPL-MCNC: 2.6 MG/DL — SIGNIFICANT CHANGE UP (ref 2.5–4.5)
PLATELET # BLD AUTO: 566 K/UL — HIGH (ref 150–400)
POTASSIUM SERPL-MCNC: 3.2 MMOL/L — LOW (ref 3.5–5.3)
POTASSIUM SERPL-MCNC: 4 MMOL/L — SIGNIFICANT CHANGE UP (ref 3.5–5.3)
POTASSIUM SERPL-MCNC: 4 MMOL/L — SIGNIFICANT CHANGE UP (ref 3.5–5.3)
POTASSIUM SERPL-SCNC: 3.2 MMOL/L — LOW (ref 3.5–5.3)
POTASSIUM SERPL-SCNC: 4 MMOL/L — SIGNIFICANT CHANGE UP (ref 3.5–5.3)
POTASSIUM SERPL-SCNC: 4 MMOL/L — SIGNIFICANT CHANGE UP (ref 3.5–5.3)
RBC # BLD: 4.06 M/UL — SIGNIFICANT CHANGE UP (ref 3.8–5.2)
RBC # FLD: 12.1 % — SIGNIFICANT CHANGE UP (ref 10.3–14.5)
SODIUM SERPL-SCNC: 118 MMOL/L — CRITICAL LOW (ref 135–145)
SODIUM SERPL-SCNC: 119 MMOL/L — CRITICAL LOW (ref 135–145)
SODIUM SERPL-SCNC: 120 MMOL/L — CRITICAL LOW (ref 135–145)
WBC # BLD: 7.97 K/UL — SIGNIFICANT CHANGE UP (ref 3.8–10.5)
WBC # FLD AUTO: 7.97 K/UL — SIGNIFICANT CHANGE UP (ref 3.8–10.5)

## 2019-06-06 RX ORDER — POTASSIUM CHLORIDE 20 MEQ
40 PACKET (EA) ORAL EVERY 4 HOURS
Refills: 0 | Status: COMPLETED | OUTPATIENT
Start: 2019-06-06 | End: 2019-06-06

## 2019-06-06 RX ORDER — SODIUM CHLORIDE 9 MG/ML
1000 INJECTION INTRAMUSCULAR; INTRAVENOUS; SUBCUTANEOUS
Refills: 0 | Status: DISCONTINUED | OUTPATIENT
Start: 2019-06-06 | End: 2019-06-07

## 2019-06-06 RX ORDER — SODIUM CHLORIDE 9 MG/ML
1000 INJECTION INTRAMUSCULAR; INTRAVENOUS; SUBCUTANEOUS
Refills: 0 | Status: DISCONTINUED | OUTPATIENT
Start: 2019-06-06 | End: 2019-06-06

## 2019-06-06 RX ADMIN — POLYETHYLENE GLYCOL 3350 17 GRAM(S): 17 POWDER, FOR SOLUTION ORAL at 17:49

## 2019-06-06 RX ADMIN — AZITHROMYCIN 255 MILLIGRAM(S): 500 TABLET, FILM COATED ORAL at 17:48

## 2019-06-06 RX ADMIN — Medication 40 MILLIEQUIVALENT(S): at 10:12

## 2019-06-06 RX ADMIN — Medication 25 MILLIGRAM(S): at 13:50

## 2019-06-06 RX ADMIN — LISINOPRIL 2.5 MILLIGRAM(S): 2.5 TABLET ORAL at 05:32

## 2019-06-06 RX ADMIN — HEPARIN SODIUM 5000 UNIT(S): 5000 INJECTION INTRAVENOUS; SUBCUTANEOUS at 05:33

## 2019-06-06 RX ADMIN — HEPARIN SODIUM 5000 UNIT(S): 5000 INJECTION INTRAVENOUS; SUBCUTANEOUS at 17:49

## 2019-06-06 RX ADMIN — Medication 1200 MILLIGRAM(S): at 05:32

## 2019-06-06 RX ADMIN — MAGNESIUM HYDROXIDE 30 MILLILITER(S): 400 TABLET, CHEWABLE ORAL at 10:14

## 2019-06-06 RX ADMIN — Medication 25 MILLIGRAM(S): at 05:32

## 2019-06-06 RX ADMIN — Medication 40 MILLIGRAM(S): at 05:33

## 2019-06-06 RX ADMIN — SODIUM CHLORIDE 50 MILLILITER(S): 9 INJECTION INTRAMUSCULAR; INTRAVENOUS; SUBCUTANEOUS at 10:12

## 2019-06-06 RX ADMIN — POLYETHYLENE GLYCOL 3350 17 GRAM(S): 17 POWDER, FOR SOLUTION ORAL at 05:33

## 2019-06-06 RX ADMIN — Medication 25 MILLIGRAM(S): at 21:27

## 2019-06-06 RX ADMIN — CEFTRIAXONE 1000 MILLIGRAM(S): 500 INJECTION, POWDER, FOR SOLUTION INTRAMUSCULAR; INTRAVENOUS at 17:48

## 2019-06-06 RX ADMIN — Medication 40 MILLIEQUIVALENT(S): at 13:51

## 2019-06-06 RX ADMIN — Medication 1200 MILLIGRAM(S): at 17:49

## 2019-06-06 RX ADMIN — SODIUM CHLORIDE 50 MILLILITER(S): 9 INJECTION INTRAMUSCULAR; INTRAVENOUS; SUBCUTANEOUS at 17:48

## 2019-06-07 LAB
ALBUMIN SERPL ELPH-MCNC: 2.7 G/DL — LOW (ref 3.3–5)
ANION GAP SERPL CALC-SCNC: 5 MMOL/L — SIGNIFICANT CHANGE UP (ref 5–17)
BUN SERPL-MCNC: 8 MG/DL — SIGNIFICANT CHANGE UP (ref 7–23)
CALCIUM SERPL-MCNC: 8.7 MG/DL — SIGNIFICANT CHANGE UP (ref 8.5–10.1)
CHLORIDE SERPL-SCNC: 82 MMOL/L — LOW (ref 96–108)
CO2 SERPL-SCNC: 32 MMOL/L — HIGH (ref 22–31)
CREAT SERPL-MCNC: 0.56 MG/DL — SIGNIFICANT CHANGE UP (ref 0.5–1.3)
GLUCOSE SERPL-MCNC: 104 MG/DL — HIGH (ref 70–99)
PHOSPHATE SERPL-MCNC: 2.6 MG/DL — SIGNIFICANT CHANGE UP (ref 2.5–4.5)
POTASSIUM SERPL-MCNC: 4.1 MMOL/L — SIGNIFICANT CHANGE UP (ref 3.5–5.3)
POTASSIUM SERPL-SCNC: 4.1 MMOL/L — SIGNIFICANT CHANGE UP (ref 3.5–5.3)
SODIUM SERPL-SCNC: 119 MMOL/L — CRITICAL LOW (ref 135–145)

## 2019-06-07 RX ORDER — SODIUM CHLORIDE 9 MG/ML
1000 INJECTION INTRAMUSCULAR; INTRAVENOUS; SUBCUTANEOUS
Refills: 0 | Status: DISCONTINUED | OUTPATIENT
Start: 2019-06-07 | End: 2019-06-09

## 2019-06-07 RX ADMIN — Medication 25 MILLIGRAM(S): at 13:50

## 2019-06-07 RX ADMIN — SODIUM CHLORIDE 50 MILLILITER(S): 9 INJECTION INTRAMUSCULAR; INTRAVENOUS; SUBCUTANEOUS at 13:45

## 2019-06-07 RX ADMIN — AZITHROMYCIN 255 MILLIGRAM(S): 500 TABLET, FILM COATED ORAL at 17:07

## 2019-06-07 RX ADMIN — Medication 1200 MILLIGRAM(S): at 17:07

## 2019-06-07 RX ADMIN — CEFTRIAXONE 1000 MILLIGRAM(S): 500 INJECTION, POWDER, FOR SOLUTION INTRAMUSCULAR; INTRAVENOUS at 17:08

## 2019-06-07 RX ADMIN — Medication 25 MILLIGRAM(S): at 05:35

## 2019-06-07 RX ADMIN — Medication 1200 MILLIGRAM(S): at 05:35

## 2019-06-07 RX ADMIN — Medication 25 MILLIGRAM(S): at 21:19

## 2019-06-07 RX ADMIN — HEPARIN SODIUM 5000 UNIT(S): 5000 INJECTION INTRAVENOUS; SUBCUTANEOUS at 05:35

## 2019-06-07 RX ADMIN — HEPARIN SODIUM 5000 UNIT(S): 5000 INJECTION INTRAVENOUS; SUBCUTANEOUS at 17:08

## 2019-06-08 LAB
ALBUMIN SERPL ELPH-MCNC: 3 G/DL — LOW (ref 3.3–5)
ANION GAP SERPL CALC-SCNC: 6 MMOL/L — SIGNIFICANT CHANGE UP (ref 5–17)
BUN SERPL-MCNC: 6 MG/DL — LOW (ref 7–23)
CALCIUM SERPL-MCNC: 8.9 MG/DL — SIGNIFICANT CHANGE UP (ref 8.5–10.1)
CHLORIDE SERPL-SCNC: 85 MMOL/L — LOW (ref 96–108)
CO2 SERPL-SCNC: 31 MMOL/L — SIGNIFICANT CHANGE UP (ref 22–31)
CREAT SERPL-MCNC: 0.57 MG/DL — SIGNIFICANT CHANGE UP (ref 0.5–1.3)
GLUCOSE SERPL-MCNC: 97 MG/DL — SIGNIFICANT CHANGE UP (ref 70–99)
PHOSPHATE SERPL-MCNC: 2.9 MG/DL — SIGNIFICANT CHANGE UP (ref 2.5–4.5)
POTASSIUM SERPL-MCNC: 3.7 MMOL/L — SIGNIFICANT CHANGE UP (ref 3.5–5.3)
POTASSIUM SERPL-SCNC: 3.7 MMOL/L — SIGNIFICANT CHANGE UP (ref 3.5–5.3)
SODIUM SERPL-SCNC: 122 MMOL/L — LOW (ref 135–145)

## 2019-06-08 RX ORDER — SODIUM CHLORIDE 9 MG/ML
1 INJECTION INTRAMUSCULAR; INTRAVENOUS; SUBCUTANEOUS
Refills: 0 | Status: DISCONTINUED | OUTPATIENT
Start: 2019-06-08 | End: 2019-06-09

## 2019-06-08 RX ADMIN — Medication 1200 MILLIGRAM(S): at 18:12

## 2019-06-08 RX ADMIN — Medication 25 MILLIGRAM(S): at 06:03

## 2019-06-08 RX ADMIN — AZITHROMYCIN 255 MILLIGRAM(S): 500 TABLET, FILM COATED ORAL at 18:10

## 2019-06-08 RX ADMIN — Medication 1200 MILLIGRAM(S): at 06:05

## 2019-06-08 RX ADMIN — SODIUM CHLORIDE 1 GRAM(S): 9 INJECTION INTRAMUSCULAR; INTRAVENOUS; SUBCUTANEOUS at 18:11

## 2019-06-08 RX ADMIN — HEPARIN SODIUM 5000 UNIT(S): 5000 INJECTION INTRAVENOUS; SUBCUTANEOUS at 06:03

## 2019-06-08 RX ADMIN — CEFTRIAXONE 1000 MILLIGRAM(S): 500 INJECTION, POWDER, FOR SOLUTION INTRAMUSCULAR; INTRAVENOUS at 18:12

## 2019-06-08 RX ADMIN — Medication 25 MILLIGRAM(S): at 13:19

## 2019-06-08 RX ADMIN — TRAMADOL HYDROCHLORIDE 25 MILLIGRAM(S): 50 TABLET ORAL at 08:05

## 2019-06-08 RX ADMIN — HEPARIN SODIUM 5000 UNIT(S): 5000 INJECTION INTRAVENOUS; SUBCUTANEOUS at 18:12

## 2019-06-09 ENCOUNTER — TRANSCRIPTION ENCOUNTER (OUTPATIENT)
Age: 84
End: 2019-06-09

## 2019-06-09 VITALS
OXYGEN SATURATION: 99 % | TEMPERATURE: 98 F | SYSTOLIC BLOOD PRESSURE: 115 MMHG | DIASTOLIC BLOOD PRESSURE: 68 MMHG | RESPIRATION RATE: 18 BRPM | HEART RATE: 68 BPM

## 2019-06-09 LAB
ALBUMIN SERPL ELPH-MCNC: 2.7 G/DL — LOW (ref 3.3–5)
ANION GAP SERPL CALC-SCNC: 6 MMOL/L — SIGNIFICANT CHANGE UP (ref 5–17)
BUN SERPL-MCNC: 6 MG/DL — LOW (ref 7–23)
CALCIUM SERPL-MCNC: 8.6 MG/DL — SIGNIFICANT CHANGE UP (ref 8.5–10.1)
CHLORIDE SERPL-SCNC: 91 MMOL/L — LOW (ref 96–108)
CO2 SERPL-SCNC: 31 MMOL/L — SIGNIFICANT CHANGE UP (ref 22–31)
CREAT SERPL-MCNC: 0.58 MG/DL — SIGNIFICANT CHANGE UP (ref 0.5–1.3)
GLUCOSE SERPL-MCNC: 97 MG/DL — SIGNIFICANT CHANGE UP (ref 70–99)
PHOSPHATE SERPL-MCNC: 3.5 MG/DL — SIGNIFICANT CHANGE UP (ref 2.5–4.5)
POTASSIUM SERPL-MCNC: 4.1 MMOL/L — SIGNIFICANT CHANGE UP (ref 3.5–5.3)
POTASSIUM SERPL-SCNC: 4.1 MMOL/L — SIGNIFICANT CHANGE UP (ref 3.5–5.3)
SODIUM SERPL-SCNC: 128 MMOL/L — LOW (ref 135–145)

## 2019-06-09 RX ORDER — SODIUM CHLORIDE 9 MG/ML
1 INJECTION INTRAMUSCULAR; INTRAVENOUS; SUBCUTANEOUS
Qty: 5 | Refills: 0
Start: 2019-06-09 | End: 2019-06-13

## 2019-06-09 RX ORDER — SODIUM CHLORIDE 9 MG/ML
1 INJECTION INTRAMUSCULAR; INTRAVENOUS; SUBCUTANEOUS
Qty: 7 | Refills: 0
Start: 2019-06-09 | End: 2019-06-15

## 2019-06-09 RX ORDER — ASPIRIN/CALCIUM CARB/MAGNESIUM 324 MG
1 TABLET ORAL
Qty: 0 | Refills: 0 | DISCHARGE

## 2019-06-09 RX ORDER — CEFUROXIME AXETIL 250 MG
1 TABLET ORAL
Qty: 6 | Refills: 0
Start: 2019-06-09 | End: 2019-06-11

## 2019-06-09 RX ORDER — METOPROLOL TARTRATE 50 MG
1 TABLET ORAL
Qty: 90 | Refills: 0
Start: 2019-06-09 | End: 2019-07-08

## 2019-06-09 RX ORDER — LANOLIN ALCOHOL/MO/W.PET/CERES
1 CREAM (GRAM) TOPICAL
Qty: 0 | Refills: 0 | DISCHARGE

## 2019-06-09 RX ADMIN — SODIUM CHLORIDE 1 GRAM(S): 9 INJECTION INTRAMUSCULAR; INTRAVENOUS; SUBCUTANEOUS at 05:46

## 2019-06-09 RX ADMIN — Medication 1200 MILLIGRAM(S): at 05:46

## 2019-06-09 RX ADMIN — HEPARIN SODIUM 5000 UNIT(S): 5000 INJECTION INTRAVENOUS; SUBCUTANEOUS at 05:46

## 2019-06-09 RX ADMIN — Medication 25 MILLIGRAM(S): at 05:46

## 2019-06-09 NOTE — PROGRESS NOTE ADULT - SUBJECTIVE AND OBJECTIVE BOX
HPI: 83 y/o female with PMHx of HTN and allergic rhinitis that presents to the ED with complaints of  progressive shortness of breath and lower extremity swelling.   She states that she was seen by a Cardiologist at [facility] and was told that she has "atrial enlargement"  She states that she has been placed on Lasix but has not taken the medication due to urticaria.  Patient denies fevers and chills but does complain of chest pain with dry cough which she has had over the last 3 weeks.  Denies other symptoms.  pt states she has long hx of hyponatremia - Na 121- 122 pt does not know baseline    follows with PCP in [place]   CXR reviewed with possible infiltrates. CT chest noted. Started abx.     today   still poor intake  less dizziness  no sob   c.o cough       PAST MEDICAL & SURGICAL HISTORY:  HTN  Rhinitis    Home Medications:  aspirin 325 mg oral tablet: 1 tab(s) orally once a day, As Needed pain (03 Jun 2019 11:24)  Melatonin 3 mg oral tablet: 1 tab(s) orally once (at bedtime), As Needed (03 Jun 2019 11:24)      MEDICATIONS  (STANDING):  azithromycin  IVPB      azithromycin  IVPB 500 milliGRAM(s) IV Intermittent every 24 hours  cefTRIAXone Injectable. 1000 milliGRAM(s) IV Push every 24 hours  cefTRIAXone Injectable.      guaiFENesin ER 1200 milliGRAM(s) Oral every 12 hours  heparin  Injectable 5000 Unit(s) SubCutaneous every 12 hours  metoprolol tartrate 25 milliGRAM(s) Oral every 8 hours  sodium chloride 0.9%. 1000 milliLiter(s) (50 mL/Hr) IV Continuous <Continuous>    MEDICATIONS  (PRN):  HYDROcodone/homatropine Syrup 5 milliLiter(s) Oral every 6 hours PRN Cough  magnesium hydroxide Suspension 30 milliLiter(s) Oral daily PRN Constipation        Allergies    No Known Allergies    Intolerances        SOCIAL HISTORY:  Denies ETOh,Smoking,     FAMILY HISTORY:  No pertinent family history in first degree relatives      REVIEW OF SYSTEMS:    CONSTITUTIONAL: No weakness, fevers or chills  EYES/ENT: No visual changes;  No vertigo or throat pain   NECK: No pain or stiffness  RESPIRATORY: No wheezing, hemoptysis; No shortness of breath  CARDIOVASCULAR: No chest pain or palpitations  GASTROINTESTINAL: No abdominal or epigastric pain. No nausea, vomiting, or hematemesis; No diarrhea or constipation. No melena or hematochezia.  GENITOURINARY: No dysuria, frequency or hematuria  NEUROLOGICAL: No numbness or weakness  SKIN: No itching, burning, rashes, or lesions   All other review of systems is negative unless indicated above.      ICU Vital Signs Last 24 Hrs  T(C): 36.5 (07 Jun 2019 05:08), Max: 36.5 (07 Jun 2019 05:08)  T(F): 97.7 (07 Jun 2019 05:08), Max: 97.7 (07 Jun 2019 05:08)  HR: 69 (07 Jun 2019 05:08) (69 - 76)  BP: 142/81 (07 Jun 2019 05:08) (114/67 - 142/81)  BP(mean): --  ABP: --  ABP(mean): --  RR: 18 (06 Jun 2019 17:40) (18 - 18)  SpO2: 97% (07 Jun 2019 05:08) (91% - 97%)          I&O's Detail    05 Jun 2019 07:01  -  06 Jun 2019 07:00  --------------------------------------------------------  IN:  Total IN: 0 mL  I&O's Detail    06 Jun 2019 07:01  -  07 Jun 2019 07:00  --------------------------------------------------------  IN:  Total IN: 0 mL    OUT:    Voided: 200 mL  Total OUT: 200 mL    Total NET: -200 mL      PHYSICAL EXAM:    Constitutional: NAD  HEENT:  EOMI,  MMM  Neck: No LAD, No JVD  Respiratory: poor AE bilat   Cardiovascular: S1 and S2  Gastrointestinal: BS+, soft, NT/ND  Extremities:  peripheral edema trace +  Neurological: A/O x 3, no focal deficits  Psychiatric: Normal mood, normal affect  : No [place]  Skin: No rashes  Access: Not applicable    LABS:    119    |  82     |  8      ----------------------------<  104       07 Jun 2019 10:41  4.1     |  32     |  0.56     119    |  80     |  9      ----------------------------<  115       06 Jun 2019 16:49  4.0     |  30     |  0.53     118    |  78     |  8      ----------------------------<  103       06 Jun 2019 06:58  3.2     |  33     |  0.57     Ca    8.7        07 Jun 2019 10:41  Ca    8.1        06 Jun 2019 16:49    Phos  2.6       07 Jun 2019 10:41  Phos  2.6       06 Jun 2019 16:49    Mg     2.0       04 Jun 2019 07:13    TPro  x      /  Alb  2.7    /  TBili  x      /        07 Jun 2019 10:41  DBili  x      /  AST  x      /  ALT  x      /  AlkPhos  x        TPro  x      /  Alb  2.7    /  TBili  x      /        06 Jun 2019 16:49  DBili  x      /  AST  x      /  ALT  x      /  AlkPhos  x               118    |  78     |  8      ----------------------------<  103       06 Jun 2019 06:58  3.2     |  33     |  0.57     122    |  83     |  12     ----------------------------<  112       05 Jun 2019 07:20  3.8     |  32     |  0.50     x      |  x      |  x      ----------------------------<  x         04 Jun 2019 14:39  3.7     |  x      |  x        Ca    8.9        06 Jun 2019 06:58  Ca    8.3        05 Jun 2019 07:20      Mg     2.0       04 Jun 2019 07:13    TPro  5.8    /  Alb  2.5    /  TBili  0.5    /        05 Jun 2019 07:20  DBili  x      /  AST  15     /  ALT  25     /  AlkPhos  272          Urine Studies:    Urinalysis (06.02.19 @ 16:09)    Glucose Qualitative, Urine: Negative mg/dL    Blood, Urine: Negative    pH Urine: 7.0    Color: Yellow    Urine Appearance: Clear    Bilirubin: Negative    Ketone - Urine: Negative    Specific Gravity: 1.010    Protein, Urine: Negative mg/dL    Urobilinogen: Negative mg/dL    Nitrite: Negative    Leukocyte Esterase Concentration: Trace          RADIOLOGY & ADDITIONAL STUDIES:
Subjective:    pat better, sitting in chair, no respiratory distress.    Home Medications:  aspirin 325 mg oral tablet: 1 tab(s) orally once a day, As Needed pain (03 Jun 2019 11:24)  Melatonin 3 mg oral tablet: 1 tab(s) orally once (at bedtime), As Needed (03 Jun 2019 11:24)    MEDICATIONS  (STANDING):  azithromycin  IVPB      azithromycin  IVPB 500 milliGRAM(s) IV Intermittent every 24 hours  cefTRIAXone Injectable. 1000 milliGRAM(s) IV Push every 24 hours  cefTRIAXone Injectable.      guaiFENesin ER 1200 milliGRAM(s) Oral every 12 hours  heparin  Injectable 5000 Unit(s) SubCutaneous every 12 hours  metoprolol tartrate 25 milliGRAM(s) Oral every 8 hours  sodium chloride 0.9%. 1000 milliLiter(s) (50 mL/Hr) IV Continuous <Continuous>    MEDICATIONS  (PRN):  HYDROcodone/homatropine Syrup 5 milliLiter(s) Oral every 6 hours PRN Cough  magnesium hydroxide Suspension 30 milliLiter(s) Oral daily PRN Constipation      Allergies    No Known Allergies    Intolerances        Vital Signs Last 24 Hrs  T(C): 36.3 (08 Jun 2019 04:19), Max: 36.6 (07 Jun 2019 10:40)  T(F): 97.3 (08 Jun 2019 04:19), Max: 97.9 (07 Jun 2019 16:32)  HR: 66 (08 Jun 2019 04:19) (66 - 72)  BP: 143/75 (08 Jun 2019 04:19) (128/72 - 143/75)  BP(mean): --  RR: 18 (08 Jun 2019 04:19) (18 - 19)  SpO2: 92% (08 Jun 2019 04:19) (92% - 98%)      PHYSICAL EXAMINATION:    NECK:  Supple. No lymphadenopathy. Jugular venous pressure not elevated. Carotids equal.   HEART:   The cardiac impulse has a normal quality. Reg., Nl S1 and S2.  There are no murmurs, rubs or gallops noted  CHEST:  Chest crackles to auscultation. Normal respiratory effort.  ABDOMEN:  Soft and nontender.   EXTREMITIES:  There is no edema.       LABS:    06-08    122<L>  |  85<L>  |  6<L>  ----------------------------<  97  3.7   |  31  |  0.57    Ca    8.9      08 Jun 2019 06:56  Phos  2.9     06-08    TPro  x   /  Alb  3.0<L>  /  TBili  x   /  DBili  x   /  AST  x   /  ALT  x   /  AlkPhos  x   06-08
HOSPITALIST ATTENDING PROGRESS NOTE    Chart and meds reviewed.  Patient seen and examined.    HPI: 83 y/o female with PMHx of HTN and allergic rhinitis that presents to the ED with complaints of  progressive shortness of breath and lower extremity swelling.  Patient states that these symptoms have been worsening over the last 1 month.  She states that she was seen by a Cardiologist at [facility] and was told that she has "atrial enlargement"  She states that she has been placed on Lasix but has not taken the medication due to urticaria.  Patient denies fevers and chills but does complain of chest pain with dry cough which she has had over the last 3 weeks.  Denies other symptoms.      All 10 systems reviewed and found to be negative with the exception of what has been described above.    MEDICATIONS  (STANDING):  furosemide   Injectable 40 milliGRAM(s) IV Push every 12 hours  heparin  Injectable 5000 Unit(s) SubCutaneous every 12 hours  lisinopril 2.5 milliGRAM(s) Oral daily  metoprolol tartrate 25 milliGRAM(s) Oral every 8 hours    MEDICATIONS  (PRN):      VITALS:  T(F): 97.7 (19 @ 10:54), Max: 98.2 (19 @ 04:38)  HR: 67 (19 @ 10:54) (67 - 95)  BP: 110/67 (19 @ 10:54) (105/68 - 132/64)  RR: 18 (19 @ 10:54) (18 - 20)  SpO2: 98% (19 @ 10:54) (86% - 100%)  Wt(kg): --    I&O's Summary    2019 07:01  -  2019 07:00  --------------------------------------------------------  IN: 0 mL / OUT: 1400 mL / NET: -1400 mL        CAPILLARY BLOOD GLUCOSE          PHYSICAL EXAM:    HEENT:  pupils equal and reactive, EOMI, no oropharyngeal lesions, erythema, exudates, oral thrush  NECK:   supple, no carotid bruits, no palpable lymph nodes, no thyromegaly  CV:  +S1, +S2, regular, no murmurs or rubs  RESP:   lungs clear to auscultation bilaterally, no wheezing, rales, rhonchi, good air entry bilaterally  BREAST:  not examined  GI:  abdomen soft, non-tender, non-distended, normal BS, no bruits, no abdominal masses, no palpable masses  RECTAL:  not examined  :  not examined  MSK:   normal muscle tone, no atrophy, no rigidity, no contractions  EXT:  no clubbing, no cyanosis, no edema, no calf pain, swelling or erythema  VASCULAR:  pulses equal and symmetric in the upper and lower extremities  NEURO:  AAOX3, no focal neurological deficits, follows all commands, able to move extremities spontaneously  SKIN:  no ulcers, lesions or rashes    LABS:                            13.0   11.21 )-----------( 593      ( 2019 14:32 )             37.5     06-03    122<L>  |  83<L>  |  10  ----------------------------<  103<H>  3.4<L>   |  30  |  0.74    Ca    8.6      2019 09:40    TPro  7.1  /  Alb  3.1<L>  /  TBili  0.6  /  DBili  x   /  AST  23  /  ALT  39  /  AlkPhos  435<H>  06-02    CARDIAC MARKERS ( 2019 18:13 )  0.039 ng/mL / x     / x     / x     / x      CARDIAC MARKERS ( 2019 14:32 )  0.031 ng/mL / x     / x     / x     / x          LIVER FUNCTIONS - ( 2019 14:32 )  Alb: 3.1 g/dL / Pro: 7.1 gm/dL / ALK PHOS: 435 U/L / ALT: 39 U/L / AST: 23 U/L / GGT: x             Urinalysis Basic - ( 2019 16:09 )    Color: Yellow / Appearance: Clear / S.010 / pH: x  Gluc: x / Ketone: Negative  / Bili: Negative / Urobili: Negative mg/dL   Blood: x / Protein: Negative mg/dL / Nitrite: Negative   Leuk Esterase: Trace / RBC: Negative /HPF / WBC 0-2   Sq Epi: x / Non Sq Epi: Negative / Bacteria: Occasional                                    CULTURES:
HOSPITALIST ATTENDING PROGRESS NOTE    Chart and meds reviewed.  Patient seen and examined.    HPI: 83 y/o female with PMHx of HTN and allergic rhinitis that presents to the ED with complaints of  progressive shortness of breath and lower extremity swelling.  Patient states that these symptoms have been worsening over the last 1 month.  She states that she was seen by a Cardiologist at [facility] and was told that she has "atrial enlargement"  She states that she has been placed on Lasix but has not taken the medication due to urticaria.  Patient denies fevers and chills but does complain of chest pain with dry cough which she has had over the last 3 weeks.  Denies other symptoms.    6/4/19 pt seen and examined, coughing alot, unable to sleep, uncomfortable, no sputum. CXR reviewed with possible infiltrates. CT chest noted. Started abx.     6/5/19 pt seen and examined, c/o not having a BM since past 7 days, some mild abd discomfort with food, passing flatus.     6/6/19 a little dizzy, not able to eat much, some nausea, no BM yesterday, d/w  and  today.     6/7/19 pt seen and examined, s/p BM this AM, feels better, no nausea, was able to eat. Na still low. Trial of IVF today as well.     All 10 systems reviewed and found to be negative with the exception of what has been described above.    MEDICATIONS  (STANDING):  azithromycin  IVPB      azithromycin  IVPB 500 milliGRAM(s) IV Intermittent every 24 hours  cefTRIAXone Injectable. 1000 milliGRAM(s) IV Push every 24 hours  cefTRIAXone Injectable.      guaiFENesin ER 1200 milliGRAM(s) Oral every 12 hours  heparin  Injectable 5000 Unit(s) SubCutaneous every 12 hours  metoprolol tartrate 25 milliGRAM(s) Oral every 8 hours  sodium chloride 0.9%. 1000 milliLiter(s) (50 mL/Hr) IV Continuous <Continuous>    MEDICATIONS  (PRN):  HYDROcodone/homatropine Syrup 5 milliLiter(s) Oral every 6 hours PRN Cough  magnesium hydroxide Suspension 30 milliLiter(s) Oral daily PRN Constipation      VITALS:  T(F): 97.8 (06-07-19 @ 10:40), Max: 97.8 (06-07-19 @ 10:40)  HR: 70 (06-07-19 @ 10:40) (69 - 71)  BP: 128/72 (06-07-19 @ 10:40) (114/67 - 142/81)  RR: 18 (06-07-19 @ 10:40) (18 - 18)  SpO2: 98% (06-07-19 @ 10:40) (91% - 98%)  Wt(kg): --    I&O's Summary    06 Jun 2019 07:01  -  07 Jun 2019 07:00  --------------------------------------------------------  IN: 0 mL / OUT: 200 mL / NET: -200 mL        CAPILLARY BLOOD GLUCOSE          PHYSICAL EXAM:    HEENT:  pupils equal and reactive, EOMI, no oropharyngeal lesions, erythema, exudates, oral thrush  NECK:   supple, no carotid bruits, no palpable lymph nodes, no thyromegaly  CV:  +S1, +S2, regular, no murmurs or rubs  RESP:   lungs clear to auscultation bilaterally, no wheezing, rales, rhonchi, good air entry bilaterally  BREAST:  not examined  GI:  abdomen soft, non-tender, non-distended, normal BS, no bruits, no abdominal masses, no palpable masses  RECTAL:  not examined  :  not examined  MSK:   normal muscle tone, no atrophy, no rigidity, no contractions  EXT:  no clubbing, no cyanosis, no edema, no calf pain, swelling or erythema  VASCULAR:  pulses equal and symmetric in the upper and lower extremities  NEURO:  AAOX3, no focal neurological deficits, follows all commands, able to move extremities spontaneously  SKIN:  no ulcers, lesions or rashes    LABS:                            12.9   7.97  )-----------( 566      ( 06 Jun 2019 06:58 )             36.7     06-07    119<LL>  |  82<L>  |  8   ----------------------------<  104<H>  4.1   |  32<H>  |  0.56    Ca    8.7      07 Jun 2019 10:41  Phos  2.6     06-07    TPro  x   /  Alb  2.7<L>  /  TBili  x   /  DBili  x   /  AST  x   /  ALT  x   /  AlkPhos  x   06-07        LIVER FUNCTIONS - ( 07 Jun 2019 10:41 )  Alb: 2.7 g/dL / Pro: x     / ALK PHOS: x     / ALT: x     / AST: x     / GGT: x                                             CULTURES:
HOSPITALIST ATTENDING PROGRESS NOTE    Chart and meds reviewed.  Patient seen and examined.    HPI: 83 y/o female with PMHx of HTN and allergic rhinitis that presents to the ED with complaints of  progressive shortness of breath and lower extremity swelling.  Patient states that these symptoms have been worsening over the last 1 month.  She states that she was seen by a Cardiologist at [facility] and was told that she has "atrial enlargement"  She states that she has been placed on Lasix but has not taken the medication due to urticaria.  Patient denies fevers and chills but does complain of chest pain with dry cough which she has had over the last 3 weeks.  Denies other symptoms.    6/4/19 pt seen and examined, coughing alot, unable to sleep, uncomfortable, no sputum. CXR reviewed with possible infiltrates. CT chest noted. Started abx.     6/5/19 pt seen and examined, c/o not having a BM since past 7 days, some mild abd discomfort with food, passing flatus.     6/6/19 a little dizzy, not able to eat much, some nausea, no BM yesterday, d/w  and  today.     All 10 systems reviewed and found to be negative with the exception of what has been described above.    MEDICATIONS  (STANDING):  azithromycin  IVPB      azithromycin  IVPB 500 milliGRAM(s) IV Intermittent every 24 hours  cefTRIAXone Injectable. 1000 milliGRAM(s) IV Push every 24 hours  cefTRIAXone Injectable.      guaiFENesin ER 1200 milliGRAM(s) Oral every 12 hours  heparin  Injectable 5000 Unit(s) SubCutaneous every 12 hours  metoprolol tartrate 25 milliGRAM(s) Oral every 8 hours  polyethylene glycol 3350 17 Gram(s) Oral every 12 hours  potassium chloride    Tablet ER 40 milliEquivalent(s) Oral every 4 hours  sodium chloride 0.9%. 1000 milliLiter(s) (50 mL/Hr) IV Continuous <Continuous>    MEDICATIONS  (PRN):  HYDROcodone/homatropine Syrup 5 milliLiter(s) Oral every 6 hours PRN Cough  magnesium hydroxide Suspension 30 milliLiter(s) Oral daily PRN Constipation      VITALS:  T(F): 98 (06-06-19 @ 04:51), Max: 98.1 (06-05-19 @ 16:34)  HR: 66 (06-06-19 @ 04:51) (66 - 88)  BP: 122/55 (06-06-19 @ 04:51) (113/62 - 131/92)  RR: 18 (06-05-19 @ 16:34) (18 - 18)  SpO2: 93% (06-06-19 @ 04:51) (92% - 94%)  Wt(kg): --    I&O's Summary    05 Jun 2019 07:01  -  06 Jun 2019 07:00  --------------------------------------------------------  IN: 0 mL / OUT: 860 mL / NET: -860 mL        CAPILLARY BLOOD GLUCOSE          PHYSICAL EXAM:    HEENT:  pupils equal and reactive, EOMI, no oropharyngeal lesions, erythema, exudates, oral thrush  NECK:   supple, no carotid bruits, no palpable lymph nodes, no thyromegaly  CV:  +S1, +S2, regular, no murmurs or rubs  RESP:   lungs clear to auscultation bilaterally, no wheezing, rales, rhonchi, good air entry bilaterally  BREAST:  not examined  GI:  abdomen soft, non-tender, non-distended, normal BS, no bruits, no abdominal masses, no palpable masses  RECTAL:  not examined  :  not examined  MSK:   normal muscle tone, no atrophy, no rigidity, no contractions  EXT:  no clubbing, no cyanosis, no edema, no calf pain, swelling or erythema  VASCULAR:  pulses equal and symmetric in the upper and lower extremities  NEURO:  AAOX3, no focal neurological deficits, follows all commands, able to move extremities spontaneously  SKIN:  no ulcers, lesions or rashes    LABS:                            12.9   7.97  )-----------( 566      ( 06 Jun 2019 06:58 )             36.7     06-06    118<LL>  |  78<L>  |  8   ----------------------------<  103<H>  3.2<L>   |  33<H>  |  0.57    Ca    8.9      06 Jun 2019 06:58    TPro  5.8<L>  /  Alb  2.5<L>  /  TBili  0.5  /  DBili  x   /  AST  15  /  ALT  25  /  AlkPhos  272<H>  06-05        LIVER FUNCTIONS - ( 05 Jun 2019 07:20 )  Alb: 2.5 g/dL / Pro: 5.8 gm/dL / ALK PHOS: 272 U/L / ALT: 25 U/L / AST: 15 U/L / GGT: x                                             CULTURES:
HOSPITALIST ATTENDING PROGRESS NOTE    Chart and meds reviewed.  Patient seen and examined.    HPI: 85 y/o female with PMHx of HTN and allergic rhinitis that presents to the ED with complaints of  progressive shortness of breath and lower extremity swelling.  Patient states that these symptoms have been worsening over the last 1 month.  She states that she was seen by a Cardiologist at [facility] and was told that she has "atrial enlargement"  She states that she has been placed on Lasix but has not taken the medication due to urticaria.  Patient denies fevers and chills but does complain of chest pain with dry cough which she has had over the last 3 weeks.  Denies other symptoms.    6/4/19 pt seen and examined, coughing alot, unable to sleep, uncomfortable, no sputum. CXR reviewed with possible infiltrates. CT chest noted. Started abx.     All 10 systems reviewed and found to be negative with the exception of what has been described above.    MEDICATIONS  (STANDING):  ALBUTerol/ipratropium for Nebulization 3 milliLiter(s) Nebulizer every 6 hours  azithromycin  IVPB      cefTRIAXone   IVPB      furosemide   Injectable 40 milliGRAM(s) IV Push every 12 hours  guaiFENesin ER 1200 milliGRAM(s) Oral every 12 hours  heparin  Injectable 5000 Unit(s) SubCutaneous every 12 hours  lisinopril 2.5 milliGRAM(s) Oral daily  metoprolol tartrate 25 milliGRAM(s) Oral every 8 hours    MEDICATIONS  (PRN):  HYDROcodone/homatropine Syrup 5 milliLiter(s) Oral every 6 hours PRN Cough      VITALS:  T(F): 97.7 (06-04-19 @ 16:49), Max: 98.2 (06-04-19 @ 05:06)  HR: 72 (06-04-19 @ 16:49) (71 - 82)  BP: 115/61 (06-04-19 @ 16:49) (101/50 - 128/57)  RR: 18 (06-04-19 @ 16:49) (18 - 19)  SpO2: 96% (06-04-19 @ 16:49) (94% - 96%)  Wt(kg): --    I&O's Summary    03 Jun 2019 07:01  -  04 Jun 2019 07:00  --------------------------------------------------------  IN: 360 mL / OUT: 0 mL / NET: 360 mL        CAPILLARY BLOOD GLUCOSE          PHYSICAL EXAM:    HEENT:  pupils equal and reactive, EOMI, no oropharyngeal lesions, erythema, exudates, oral thrush  NECK:   supple, no carotid bruits, no palpable lymph nodes, no thyromegaly  CV:  +S1, +S2, regular, no murmurs or rubs  RESP:   lungs clear to auscultation bilaterally, no wheezing, rales, rhonchi, good air entry bilaterally  BREAST:  not examined  GI:  abdomen soft, non-tender, non-distended, normal BS, no bruits, no abdominal masses, no palpable masses  RECTAL:  not examined  :  not examined  MSK:   normal muscle tone, no atrophy, no rigidity, no contractions  EXT:  no clubbing, no cyanosis, no edema, no calf pain, swelling or erythema  VASCULAR:  pulses equal and symmetric in the upper and lower extremities  NEURO:  AAOX3, no focal neurological deficits, follows all commands, able to move extremities spontaneously  SKIN:  no ulcers, lesions or rashes    LABS:                            12.6   9.62  )-----------( 569      ( 04 Jun 2019 07:13 )             36.6     06-04    x   |  x   |  x   ----------------------------<  x   3.7   |  x   |  x     Ca    8.1<L>      04 Jun 2019 07:13  Mg     2.0     06-04      CARDIAC MARKERS ( 02 Jun 2019 18:13 )  0.039 ng/mL / x     / x     / x     / x                                              CULTURES:
HOSPITALIST ATTENDING PROGRESS NOTE    Chart and meds reviewed.  Patient seen and examined.    HPI: 85 y/o female with PMHx of HTN and allergic rhinitis that presents to the ED with complaints of  progressive shortness of breath and lower extremity swelling.  Patient states that these symptoms have been worsening over the last 1 month.  She states that she was seen by a Cardiologist at [facility] and was told that she has "atrial enlargement"  She states that she has been placed on Lasix but has not taken the medication due to urticaria.  Patient denies fevers and chills but does complain of chest pain with dry cough which she has had over the last 3 weeks.  Denies other symptoms.    6/4/19 pt seen and examined, coughing alot, unable to sleep, uncomfortable, no sputum. CXR reviewed with possible infiltrates. CT chest noted. Started abx.     6/5/19 pt seen and examined, c/o not having a BM since past 7 days, some mild abd discomfort with food, passing flatus.     All 10 systems reviewed and found to be negative with the exception of what has been described above.    MEDICATIONS  (STANDING):  azithromycin  IVPB      azithromycin  IVPB 500 milliGRAM(s) IV Intermittent every 24 hours  cefTRIAXone Injectable. 1000 milliGRAM(s) IV Push every 24 hours  cefTRIAXone Injectable.      furosemide   Injectable 40 milliGRAM(s) IV Push every 12 hours  guaiFENesin ER 1200 milliGRAM(s) Oral every 12 hours  heparin  Injectable 5000 Unit(s) SubCutaneous every 12 hours  lisinopril 2.5 milliGRAM(s) Oral daily  metoprolol tartrate 25 milliGRAM(s) Oral every 8 hours  polyethylene glycol 3350 17 Gram(s) Oral every 12 hours    MEDICATIONS  (PRN):  HYDROcodone/homatropine Syrup 5 milliLiter(s) Oral every 6 hours PRN Cough  magnesium hydroxide Suspension 30 milliLiter(s) Oral daily PRN Constipation      VITALS:  T(F): 97.3 (06-05-19 @ 11:20), Max: 98.3 (06-05-19 @ 05:07)  HR: 81 (06-05-19 @ 13:56) (53 - 88)  BP: 116/87 (06-05-19 @ 13:56) (107/60 - 131/92)  RR: 18 (06-05-19 @ 11:20) (18 - 18)  SpO2: 94% (06-05-19 @ 11:20) (93% - 96%)  Wt(kg): --    I&O's Summary      CAPILLARY BLOOD GLUCOSE          PHYSICAL EXAM:    HEENT:  pupils equal and reactive, EOMI, no oropharyngeal lesions, erythema, exudates, oral thrush  NECK:   supple, no carotid bruits, no palpable lymph nodes, no thyromegaly  CV:  +S1, +S2, regular, no murmurs or rubs  RESP:   lungs clear to auscultation bilaterally, no wheezing, rales, rhonchi, good air entry bilaterally  BREAST:  not examined  GI:  abdomen soft, non-tender, non-distended, normal BS, no bruits, no abdominal masses, no palpable masses  RECTAL:  not examined  :  not examined  MSK:   normal muscle tone, no atrophy, no rigidity, no contractions  EXT:  no clubbing, no cyanosis, no edema, no calf pain, swelling or erythema  VASCULAR:  pulses equal and symmetric in the upper and lower extremities  NEURO:  AAOX3, no focal neurological deficits, follows all commands, able to move extremities spontaneously  SKIN:  no ulcers, lesions or rashes    LABS:                            12.0   8.99  )-----------( 478      ( 05 Jun 2019 07:20 )             34.4     06-05    122<L>  |  83<L>  |  12  ----------------------------<  112<H>  3.8   |  32<H>  |  0.50    Ca    8.3<L>      05 Jun 2019 07:20  Mg     2.0     06-04    TPro  5.8<L>  /  Alb  2.5<L>  /  TBili  0.5  /  DBili  x   /  AST  15  /  ALT  25  /  AlkPhos  272<H>  06-05        LIVER FUNCTIONS - ( 05 Jun 2019 07:20 )  Alb: 2.5 g/dL / Pro: 5.8 gm/dL / ALK PHOS: 272 U/L / ALT: 25 U/L / AST: 15 U/L / GGT: x                                             CULTURES:
HPI: 83 y/o female with PMHx of HTN and allergic rhinitis that presents to the ED with complaints of  progressive shortness of breath and lower extremity swelling.   She states that she was seen by a Cardiologist at [facility] and was told that she has "atrial enlargement"  She states that she has been placed on Lasix but has not taken the medication due to urticaria.  Patient denies fevers and chills but does complain of chest pain with dry cough which she has had over the last 3 weeks.  Denies other symptoms.  pt states she has long hx of hyponatremia - Na 121- 122 pt does not know baseline    follows with PCP in [place]   CXR reviewed with possible infiltrates. CT chest noted. Started abx.     today   feeling dizziness and poor po intake   feelnig nausea  no sob   c.o cough       PAST MEDICAL & SURGICAL HISTORY:  HTN  Rhinitis    Home Medications:  aspirin 325 mg oral tablet: 1 tab(s) orally once a day, As Needed pain (03 Jun 2019 11:24)  Melatonin 3 mg oral tablet: 1 tab(s) orally once (at bedtime), As Needed (03 Jun 2019 11:24)      MEDICATIONS  (STANDING):  azithromycin  IVPB      azithromycin  IVPB 500 milliGRAM(s) IV Intermittent every 24 hours  cefTRIAXone Injectable. 1000 milliGRAM(s) IV Push every 24 hours  cefTRIAXone Injectable.      guaiFENesin ER 1200 milliGRAM(s) Oral every 12 hours  heparin  Injectable 5000 Unit(s) SubCutaneous every 12 hours  metoprolol tartrate 25 milliGRAM(s) Oral every 8 hours  polyethylene glycol 3350 17 Gram(s) Oral every 12 hours  sodium chloride 0.9%. 1000 milliLiter(s) (50 mL/Hr) IV Continuous <Continuous>    MEDICATIONS  (PRN):  HYDROcodone/homatropine Syrup 5 milliLiter(s) Oral every 6 hours PRN Cough  magnesium hydroxide Suspension 30 milliLiter(s) Oral daily PRN Constipation        Allergies    No Known Allergies    Intolerances        SOCIAL HISTORY:  Denies ETOh,Smoking,     FAMILY HISTORY:  No pertinent family history in first degree relatives      REVIEW OF SYSTEMS:    CONSTITUTIONAL: No weakness, fevers or chills  EYES/ENT: No visual changes;  No vertigo or throat pain   NECK: No pain or stiffness  RESPIRATORY: No wheezing, hemoptysis; No shortness of breath  CARDIOVASCULAR: No chest pain or palpitations  GASTROINTESTINAL: No abdominal or epigastric pain. No nausea, vomiting, or hematemesis; No diarrhea or constipation. No melena or hematochezia.  GENITOURINARY: No dysuria, frequency or hematuria  NEUROLOGICAL: No numbness or weakness  SKIN: No itching, burning, rashes, or lesions   All other review of systems is negative unless indicated above.    Vital Signs Last 24 Hrs  T(C): 37 (06 Jun 2019 11:03), Max: 37 (06 Jun 2019 11:03)  T(F): 98.6 (06 Jun 2019 11:03), Max: 98.6 (06 Jun 2019 11:03)  HR: 120 (06 Jun 2019 13:42) (66 - 120)  BP: 116/78 (06 Jun 2019 13:42) (113/62 - 137/74)  BP(mean): --  RR: 18 (06 Jun 2019 11:03) (18 - 18)  SpO2: 93% (06 Jun 2019 11:03) (92% - 93%)  I&O's Summary    I&O's Detail    05 Jun 2019 07:01  -  06 Jun 2019 07:00  --------------------------------------------------------  IN:  Total IN: 0 mL    OUT:    Voided: 860 mL  Total OUT: 860 mL    Total NET: -860 mL      PHYSICAL EXAM:    Constitutional: NAD  HEENT:  EOMI,  MMM  Neck: No LAD, No JVD  Respiratory: poor AE bilat   Cardiovascular: S1 and S2  Gastrointestinal: BS+, soft, NT/ND  Extremities:  peripheral edema trace +  Neurological: A/O x 3, no focal deficits  Psychiatric: Normal mood, normal affect  : No Cal[place]  Skin: No rashes  Access: Not applicable    LABS:       118    |  78     |  8      ----------------------------<  103       06 Jun 2019 06:58  3.2     |  33     |  0.57     122    |  83     |  12     ----------------------------<  112       05 Jun 2019 07:20  3.8     |  32     |  0.50     x      |  x      |  x      ----------------------------<  x         04 Jun 2019 14:39  3.7     |  x      |  x        Ca    8.9        06 Jun 2019 06:58  Ca    8.3        05 Jun 2019 07:20      Mg     2.0       04 Jun 2019 07:13    TPro  5.8    /  Alb  2.5    /  TBili  0.5    /        05 Jun 2019 07:20  DBili  x      /  AST  15     /  ALT  25     /  AlkPhos  272          Urine Studies:    Urinalysis (06.02.19 @ 16:09)    Glucose Qualitative, Urine: Negative mg/dL    Blood, Urine: Negative    pH Urine: 7.0    Color: Yellow    Urine Appearance: Clear    Bilirubin: Negative    Ketone - Urine: Negative    Specific Gravity: 1.010    Protein, Urine: Negative mg/dL    Urobilinogen: Negative mg/dL    Nitrite: Negative    Leukocyte Esterase Concentration: Trace          RADIOLOGY & ADDITIONAL STUDIES:
NEPHROLOGY INTERVAL HPI/OVERNIGHT EVENTS:  06-08-19 @ 18:12  covering dr wright  pt was hopeful to be dc today  has been admitted for chf and pleural effusion with hyponatremia chronically noted  had PTA fallen at home that had precipated the admission   no hctz use  multiple allergies has led her to stop alot ofher medication         ED course  - patient found to have elevated BNP with CXR findings significant for left pleural effusion and right infiltrate   BP elevated in ED.  Given 40 lasix IVP (02 Jun 2019 19:55)      Patient is a 84y old  Female who presents with a chief complaint of Shortness of breath and cough (08 Jun 2019 14:15)      MEDICATIONS  (STANDING):  azithromycin  IVPB      azithromycin  IVPB 500 milliGRAM(s) IV Intermittent every 24 hours  cefTRIAXone Injectable. 1000 milliGRAM(s) IV Push every 24 hours  cefTRIAXone Injectable.      guaiFENesin ER 1200 milliGRAM(s) Oral every 12 hours  heparin  Injectable 5000 Unit(s) SubCutaneous every 12 hours  metoprolol tartrate 25 milliGRAM(s) Oral every 8 hours  sodium chloride 1 Gram(s) Oral two times a day  sodium chloride 0.9%. 1000 milliLiter(s) (50 mL/Hr) IV Continuous <Continuous>    MEDICATIONS  (PRN):  HYDROcodone/homatropine Syrup 5 milliLiter(s) Oral every 6 hours PRN Cough  magnesium hydroxide Suspension 30 milliLiter(s) Oral daily PRN Constipation      Allergies    No Known Allergies    Intolerances        I&O's Detail    07 Jun 2019 07:01  -  08 Jun 2019 07:00  --------------------------------------------------------  IN:    Oral Fluid: 800 mL  Total IN: 800 mL    OUT:  Total OUT: 0 mL    Total NET: 800 mL          Vital Signs Last 24 Hrs  T(C): 36.3 (08 Jun 2019 17:24), Max: 36.3 (08 Jun 2019 04:19)  T(F): 97.4 (08 Jun 2019 17:24), Max: 97.4 (08 Jun 2019 11:28)  HR: 66 (08 Jun 2019 17:24) (66 - 73)  BP: 143/89 (08 Jun 2019 17:24) (115/52 - 143/89)  BP(mean): --  RR: 18 (08 Jun 2019 17:24) (18 - 18)  SpO2: 91% (08 Jun 2019 17:24) (91% - 96%)  Daily     Daily     PHYSICAL EXAM:  General: alert. awake Ox3  HEENT: MMM  CV: s1s2 rrr  LUNGS: B/L CTA  EXT: no edema    LABS:    06-08    122<L>  |  85<L>  |  6<L>  ----------------------------<  97  3.7   |  31  |  0.57    Ca    8.9      08 Jun 2019 06:56  Phos  2.9     06-08    TPro  x   /  Alb  3.0<L>  /  TBili  x   /  DBili  x   /  AST  x   /  ALT  x   /  AlkPhos  x   06-08        Phosphorus Level, Serum: 2.9 mg/dL (06-08 @ 06:56)
Patient is a 84y old  Female who presents with a chief complaint of Shortness of breath and cough.      HPI:  83 y/o female with PMHx of HTN and allergic rhinitis that presents to the ED with complaints of  progressive shortness of breath and lower extremity swelling.  Patient states that these symptoms have been worsening over the last 1 month.  She states that she was seen by a Cardiologist at [facility] and was told that she has "aortic enlargement"  She states that she has been placed on Lasix but has not taken the medication due to urticaria.  Patient denies fevers and chills but does complain of chest pain with dry cough which she has had over the last 3 weeks.  Denies other symptoms.    ED course  - patient found to have elevated BNP with CXR findings significant for left pleural effusion and right infiltrate   BP elevated in ED.  Given 40 lasix IVP     6/3- pt seen and examined this am and she still c/o dry cough.  c/o pedal edema.   Denies any history of CHF in the past.     6/4- pt seen and examined by me today. Pt denies any CP but still c/o cough.     6/6-  Pt seen and examined by me today. She c/o dizziness - like feeling of faint lying in bed.    6/7- pt seen and examined by me today. Pt states that the dizziness is improving.  still c/o constipated.     6/8-pt seen and examined by me today. Pt denies any dizziness.     PAST MEDICAL & SURGICAL HISTORY:  No pertinent past medical history  No significant past surgical history      MEDICATIONS  (STANDING):  furosemide   Injectable 40 milliGRAM(s) IV Push every 12 hours  heparin  Injectable 5000 Unit(s) SubCutaneous every 12 hours  lisinopril 2.5 milliGRAM(s) Oral daily  metoprolol tartrate 25 milliGRAM(s) Oral every 8 hours    MEDICATIONS  (PRN):      FAMILY HISTORY:  No pertinent family history in first degree relatives      SOCIAL HISTORY:  no recent smoking     REVIEW OF SYSTEMS:  CONSTITUTIONAL:    No fatigue, malaise, lethargy.  No fever or chills. c/o dizziness  HEENT:  Eyes:  No visual changes.     ENT:  No epistaxis.  No sinus pain.    RESPIRATORY:  c/o cough.  No wheeze.  No hemoptysis.  no shortness of breath.  CARDIOVASCULAR:  No chest pains.  No palpitations. No shortness of breath, No orthopnea or PND. no pedal edema  GASTROINTESTINAL:  No abdominal pain.  No nausea or vomiting.    GENITOURINARY:    No hematuria.    MUSCULOSKELETAL:  No musculoskeletal pain.  No joint swelling.  No arthritis.  NEUROLOGICAL:  No tingling or numbness or weakness.  PSYCHIATRIC:  No confusion      ICU Vital Signs Last 24 Hrs  T(C): 36.3 (08 Jun 2019 04:19), Max: 36.6 (07 Jun 2019 10:40)  T(F): 97.3 (08 Jun 2019 04:19), Max: 97.9 (07 Jun 2019 16:32)  HR: 66 (08 Jun 2019 04:19) (66 - 72)  BP: 143/75 (08 Jun 2019 04:19) (128/72 - 143/75)  BP(mean): --  ABP: --  ABP(mean): --  RR: 18 (08 Jun 2019 04:19) (18 - 19)  SpO2: 92% (08 Jun 2019 04:19) (92% - 98%)        PHYSICAL EXAM-    Constitutional: no acute distress  elderly female     Head: Head is normocephalic and atraumatic.      Neck: no JVD    Cardiovascular: Regular rate and rhythm without S3, S4. No murmurs or rubs are appreciated.      Respiratory: Breath sounds decreased at left base    Abdomen: Soft, nontender, nondistended with positive bowel sounds.      Extremity: No tenderness. no  pitting pedal edema- improving   b/l in LE, chronic skin changes    Neurologic: The patient is alert and oriented.      Skin: No rash, no obvious lesions noted.      Psychiatric: The patient appears to be emotionally stable.      INTERPRETATION OF TELEMETRY:  SR     ECG: Sinus rythm , L axis, PACs and PVCs isolated.    I&O's Detail    02 Jun 2019 07:01  -  03 Jun 2019 07:00  --------------------------------------------------------  IN:  Total IN: 0 mL    OUT:    Voided: 1400 mL  Total OUT: 1400 mL    Total NET: -1400 mL    06-07    119<LL>  |  82<L>  |  8   ----------------------------<  104<H>  4.1   |  32<H>  |  0.56    Ca    8.7      07 Jun 2019 10:41  Phos  2.6     06-07    TPro  x   /  Alb  2.7<L>  /  TBili  x   /  DBili  x   /  AST  x   /  ALT  x   /  AlkPhos  x   06-07        LIVER FUNCTIONS - ( 07 Jun 2019 10:41 )  Alb: 2.7 g/dL / Pro: x     / ALK PHOS: x     / ALT: x     / AST: x     / GGT: x                   LIVER FUNCTIONS - ( 06 Jun 2019 16:49 )  Alb: 2.7 g/dL / Pro: x     / ALK PHOS: x     / ALT: x     / AST: x     / GGT: x                               I&O's Summary    02 Jun 2019 07:01  -  03 Jun 2019 07:00  --------------------------------------------------------  IN: 0 mL / OUT: 1400 mL / NET: -1400 mL      BNPSerum Pro-Brain Natriuretic Peptide: 1855 pg/mL (06-02 @ 14:32)    RADIOLOGY & ADDITIONAL STUDIES:  < from: Xray Chest 1 View-PORTABLE IMMEDIATE (06.02.19 @ 15:16) >    EXAM:  XR CHEST PORTABLE IMMED 1V                            PROCEDURE DATE:  06/02/2019          INTERPRETATION:  AP semierect chest on June 2, 2019 at 3:10 PM. Patient   has cough and chest pain for 2 weeks.    COMPARISON: None available.    Heart size cannot be assessed.    There is a moderate left effusion.    There is a small amount a right base infiltrate.    IMPRESSION: Bibasilar chest findings as above.                TREVOR LIRA   This document has been electronically signed. Jun 2 2019  3:40PM              < end of copied text >  < from: Transthoracic Echocardiogram (06.03.19 @ 14:09) >     EXAM:  ECHO TTE WO CON COMP W DOP         PROCEDURE DATE:  06/03/2019        INTERPRETATION:  Transthoracic Echocardiography Report (TTE)     Demographics     Patient name        MAYRA NORMAN  Age           84 year(s)     Med Rec #           [id number]         Gender        Female     Account #           [id number]           Date of Birth 11/04/1934     Interpreting        Michael Dick,   Room Number   0333   Physician           MD Michael Dick MD     Referring Physician naren Zheng   Sonographer   Lroenchrissy King                                                       [facility]     Date of study       06/03/2019 01:44                       PM     Height              68.9 in           Weight       149.92 pounds    Type of Study:     TTE procedure: ECHO TTE WO CON COMP W DOP     BP: 125/65 mmHg     Study Location: 3ETechnical Quality: Fair    Indications   1) I50.9 - Heart failure    M-Mode Measurements (cm)     LVEDd: 5.79 cmLVESd: 3.98 cm   IVSEd: 1.3 cm   LVPWd: 1 cm               AO Root Dimension: 3.6 cm                             ACS: 2.6 cm    Doppler Measurements:                                    MV Peak E-Wave: 91.8 cm/s   TR Velocity:282 cm/s           MV Peak A-Wave: 58.7 cm/s   TR Gradient:31.8096 mmHg       MV E/A Ratio: 1.56 %   Estimated RAP:10 mmHg          MV Peak Gradient: 3.37 mmHg   RVSP:42 mmHg     Findings     Mitral Valve   Mild (1+) mitral regurgitation is present.     Aortic Valve   Mild aortic sclerosis is present with normal valvular opening.   Mild aortic regurgitation is present.     Tricuspid Valve   Mild (1+) tricuspid valve regurgitation is present.   The estimated RVSP is 37 mmHg.     Pulmonic Valve   Normal appearing pulmonic valve structure and function.   Trace pulmonic valvular regurgitation is present.     Left Atrium   The left atrium is moderately dilated.     Left Ventricle   The left ventricle is normal in size, wall motion and contractility.   Mild asymmetrical septal left ventricular hypertrophy is present.   Estimated left ventricular ejection fraction is 60-65 %.     Right Atrium   The right atrium appears mildly dilated.     Right Ventricle   Normal appearing right ventricle structure and function.     Pericardial Effusion   A small pericardial effusion is present.     Pleural Effusion   Pleural effusion - is present.     Miscellaneous   Dilatation of the ascending aorta is noted at 4.4 cm diameter.     Impression     Summary     The left ventricle is normal in size, wall motion and contractility.   Mild asymmetrical septal left ventricular hypertrophy is present.   Estimated left ventricular ejection fraction is 60-65 %.   Dilatation of the ascending aorta is noted at 4.4 cm diameter.   A small pericardial effusion is present.   Pleural effusion - is present.   Mild aortic sclerosis is present with normal valvular opening.   Mild aortic regurgitation is present.   Mild (1+) tricuspid valve regurgitation is present.   The estimated RVSP is 37 mmHg.     Signature     ----------------------------------------------------------------   Electronically signed by Michael Dick MD(Interpreting   physician) on 06/03/2019 03:54 PM    < end of copied text >
Patient is a 84y old  Female who presents with a chief complaint of Shortness of breath and cough.      HPI:  83 y/o female with PMHx of HTN and allergic rhinitis that presents to the ED with complaints of  progressive shortness of breath and lower extremity swelling.  Patient states that these symptoms have been worsening over the last 1 month.  She states that she was seen by a Cardiologist at [facility] and was told that she has "aortic enlargement"  She states that she has been placed on Lasix but has not taken the medication due to urticaria.  Patient denies fevers and chills but does complain of chest pain with dry cough which she has had over the last 3 weeks.  Denies other symptoms.    ED course  - patient found to have elevated BNP with CXR findings significant for left pleural effusion and right infiltrate   BP elevated in ED.  Given 40 lasix IVP     6/3- pt seen and examined this am and she still c/o dry cough.  c/o pedal edema.   Denies any history of CHF in the past.     - pt seen and examined by me today. Pt denies any CP but still c/o cough.       PAST MEDICAL & SURGICAL HISTORY:  No pertinent past medical history  No significant past surgical history      MEDICATIONS  (STANDING):  furosemide   Injectable 40 milliGRAM(s) IV Push every 12 hours  heparin  Injectable 5000 Unit(s) SubCutaneous every 12 hours  lisinopril 2.5 milliGRAM(s) Oral daily  metoprolol tartrate 25 milliGRAM(s) Oral every 8 hours    MEDICATIONS  (PRN):      FAMILY HISTORY:  No pertinent family history in first degree relatives      SOCIAL HISTORY:  no recent smoking     REVIEW OF SYSTEMS:  CONSTITUTIONAL:    No fatigue, malaise, lethargy.  No fever or chills.  HEENT:  Eyes:  No visual changes.     ENT:  No epistaxis.  No sinus pain.    RESPIRATORY:  c/o cough.  No wheeze.  No hemoptysis.  no shortness of breath.  CARDIOVASCULAR:  No chest pains.  No palpitations. No shortness of breath, No orthopnea or PND. c/o pedal edema  GASTROINTESTINAL:  No abdominal pain.  No nausea or vomiting.    GENITOURINARY:    No hematuria.    MUSCULOSKELETAL:  No musculoskeletal pain.  No joint swelling.  No arthritis.  NEUROLOGICAL:  No tingling or numbness or weakness.  PSYCHIATRIC:  No confusion  SKIN:  No rashes.    ENDOCRINE:  No unexplained weight loss.  No polydipsia.   HEMATOLOGIC:  No anemia.  No prolonged or excessive bleeding.   ALLERGIC AND IMMUNOLOGIC:  No pruritus.        ICU Vital Signs Last 24 Hrs  T(C): 36.8 (2019 05:06), Max: 36.8 (2019 05:06)  T(F): 98.2 (2019 05:06), Max: 98.2 (2019 05:06)  HR: 72 (2019 05:06) (67 - 80)  BP: 115/60 (2019 05:06) (105/68 - 140/75)  BP(mean): --  ABP: --  ABP(mean): --  RR: 19 (2019 05:06) (18 - 19)  SpO2: 95% (2019 05:06) (95% - 100%)      PHYSICAL EXAM-    Constitutional: no acute distress  elderly female     Head: Head is normocephalic and atraumatic.      Neck: positive hepatojugular reflux    Cardiovascular: Regular rate and rhythm without S3, S4. No murmurs or rubs are appreciated.      Respiratory: Breath sounds decreased at left base    Abdomen: Soft, nontender, nondistended with positive bowel sounds.      Extremity: No tenderness. 2+  pitting pedal edema- improving   b/l in LE, chronic skin changes    Neurologic: The patient is alert and oriented.      Skin: No rash, no obvious lesions noted.      Psychiatric: The patient appears to be emotionally stable.      INTERPRETATION OF TELEMETRY:  SR     ECG: Sinus rythm , L axis, PACs and PVCs isolated.    I&O's Detail    2019 07:01  -  2019 07:00  --------------------------------------------------------  IN:  Total IN: 0 mL    OUT:    Voided: 1400 mL  Total OUT: 1400 mL    Total NET: -1400 mL          LABS:                        13.0   11.21 )-----------( 593      ( 2019 14:32 )             37.5     06-02    123<L>  |  85<L>  |  8   ----------------------------<  119<H>  3.5   |  29  |  0.59    Ca    8.8      2019 14:32    TPro  7.1  /  Alb  3.1<L>  /  TBili  0.6  /  DBili  x   /  AST  23  /  ALT  39  /  AlkPhos  435<H>  06-02    CARDIAC MARKERS ( 2019 18:13 )  0.039 ng/mL / x     / x     / x     / x      CARDIAC MARKERS ( 2019 14:32 )  0.031 ng/mL / x     / x     / x     / x            Urinalysis Basic - ( 2019 16:09 )    Color: Yellow / Appearance: Clear / S.010 / pH: x  Gluc: x / Ketone: Negative  / Bili: Negative / Urobili: Negative mg/dL   Blood: x / Protein: Negative mg/dL / Nitrite: Negative   Leuk Esterase: Trace / RBC: Negative /HPF / WBC 0-2   Sq Epi: x / Non Sq Epi: Negative / Bacteria: Occasional      I&O's Summary    2019 07:01  -  2019 07:00  --------------------------------------------------------  IN: 0 mL / OUT: 1400 mL / NET: -1400 mL      BNPSerum Pro-Brain Natriuretic Peptide: 1855 pg/mL ( @ 14:32)    RADIOLOGY & ADDITIONAL STUDIES:  < from: Xray Chest 1 View-PORTABLE IMMEDIATE (19 @ 15:16) >    EXAM:  XR CHEST PORTABLE IMMED 1V                            PROCEDURE DATE:  2019          INTERPRETATION:  AP semierect chest on 2019 at 3:10 PM. Patient   has cough and chest pain for 2 weeks.    COMPARISON: None available.    Heart size cannot be assessed.    There is a moderate left effusion.    There is a small amount a right base infiltrate.    IMPRESSION: Bibasilar chest findings as above.                TREVOR LIRA   This document has been electronically signed. 2019  3:40PM              < end of copied text >  < from: Transthoracic Echocardiogram (19 @ 14:09) >     EXAM:  ECHO TTE WO CON COMP W DOP         PROCEDURE DATE:  2019        INTERPRETATION:  Transthoracic Echocardiography Report (TTE)     Demographics     Patient name        MAYRA NORMAN  Age           84 year(s)     [facility] Rec #           [id number]         Gender        Female     Account #           [id number]           Date of Birth 1934     Interpreting        Michael Dick,   Room Number   0333   Physician           MD Michael Dick MD     Referring Physician naren Zheng   Sonographer   Lorenhilario King,                                                       [facility]     Date of study       2019 01:44                       PM     Height              68.9 in           Weight       149.92 pounds    Type of Study:     TTE procedure: ECHO TTE WO CON COMP W DOP     BP: 125/65 mmHg     Study Location: 3ETechnical Quality: Fair    Indications   1) I50.9 - Heart failure    M-Mode Measurements (cm)     LVEDd: 5.79 cmLVESd: 3.98 cm   IVSEd: 1.3 cm   LVPWd: 1 cm               AO Root Dimension: 3.6 cm                             ACS: 2.6 cm    Doppler Measurements:                                    MV Peak E-Wave: 91.8 cm/s   TR Velocity:282 cm/s           MV Peak A-Wave: 58.7 cm/s   TR Gradient:31.8096 mmHg       MV E/A Ratio: 1.56 %   Estimated RAP:10 mmHg          MV Peak Gradient: 3.37 mmHg   RVSP:42 mmHg     Findings     Mitral Valve   Mild (1+) mitral regurgitation is present.     Aortic Valve   Mild aortic sclerosis is present with normal valvular opening.   Mild aortic regurgitation is present.     Tricuspid Valve   Mild (1+) tricuspid valve regurgitation is present.   The estimated RVSP is 37 mmHg.     Pulmonic Valve   Normal appearing pulmonic valve structure and function.   Trace pulmonic valvular regurgitation is present.     Left Atrium   The left atrium is moderately dilated.     Left Ventricle   The left ventricle is normal in size, wall motion and contractility.   Mild asymmetrical septal left ventricular hypertrophy is present.   Estimated left ventricular ejection fraction is 60-65 %.     Right Atrium   The right atrium appears mildly dilated.     Right Ventricle   Normal appearing right ventricle structure and function.     Pericardial Effusion   A small pericardial effusion is present.     Pleural Effusion   Pleural effusion - is present.     Miscellaneous   Dilatation of the ascending aorta is noted at 4.4 cm diameter.     Impression     Summary     The left ventricle is normal in size, wall motion and contractility.   Mild asymmetrical septal left ventricular hypertrophy is present.   Estimated left ventricular ejection fraction is 60-65 %.   Dilatation of the ascending aorta is noted at 4.4 cm diameter.   A small pericardial effusion is present.   Pleural effusion - is present.   Mild aortic sclerosis is present with normal valvular opening.   Mild aortic regurgitation is present.   Mild (1+) tricuspid valve regurgitation is present.   The estimated RVSP is 37 mmHg.     Signature     ----------------------------------------------------------------   Electronically signed by Michael Dick MD(Interpreting   physician) on 2019 03:54 PM    < end of copied text >
Patient is a 84y old  Female who presents with a chief complaint of Shortness of breath and cough.      HPI:  85 y/o female with PMHx of HTN and allergic rhinitis that presents to the ED with complaints of  progressive shortness of breath and lower extremity swelling.  Patient states that these symptoms have been worsening over the last 1 month.  She states that she was seen by a Cardiologist at [facility] and was told that she has "aortic enlargement"  She states that she has been placed on Lasix but has not taken the medication due to urticaria.  Patient denies fevers and chills but does complain of chest pain with dry cough which she has had over the last 3 weeks.  Denies other symptoms.    ED course  - patient found to have elevated BNP with CXR findings significant for left pleural effusion and right infiltrate   BP elevated in ED.  Given 40 lasix IVP     6/3- pt seen and examined this am and she still c/o dry cough.  c/o pedal edema.   Denies any history of CHF in the past.     6/4- pt seen and examined by me today. Pt denies any CP but still c/o cough.     6/6-  Pt seen and examined by me today. She c/o dizziness - like feeling of faint lying in bed.        PAST MEDICAL & SURGICAL HISTORY:  No pertinent past medical history  No significant past surgical history      MEDICATIONS  (STANDING):  furosemide   Injectable 40 milliGRAM(s) IV Push every 12 hours  heparin  Injectable 5000 Unit(s) SubCutaneous every 12 hours  lisinopril 2.5 milliGRAM(s) Oral daily  metoprolol tartrate 25 milliGRAM(s) Oral every 8 hours    MEDICATIONS  (PRN):      FAMILY HISTORY:  No pertinent family history in first degree relatives      SOCIAL HISTORY:  no recent smoking     REVIEW OF SYSTEMS:  CONSTITUTIONAL:    No fatigue, malaise, lethargy.  No fever or chills. c/o dizziness  HEENT:  Eyes:  No visual changes.     ENT:  No epistaxis.  No sinus pain.    RESPIRATORY:  c/o cough.  No wheeze.  No hemoptysis.  no shortness of breath.  CARDIOVASCULAR:  No chest pains.  No palpitations. No shortness of breath, No orthopnea or PND. no pedal edema  GASTROINTESTINAL:  No abdominal pain.  No nausea or vomiting.    GENITOURINARY:    No hematuria.    MUSCULOSKELETAL:  No musculoskeletal pain.  No joint swelling.  No arthritis.  NEUROLOGICAL:  No tingling or numbness or weakness.  PSYCHIATRIC:  No confusion      ICU Vital Signs Last 24 Hrs  T(C): 36.7 (06 Jun 2019 04:51), Max: 36.7 (05 Jun 2019 16:34)  T(F): 98 (06 Jun 2019 04:51), Max: 98.1 (05 Jun 2019 16:34)  HR: 66 (06 Jun 2019 04:51) (66 - 88)  BP: 122/55 (06 Jun 2019 04:51) (113/62 - 131/92)  BP(mean): --  ABP: --  ABP(mean): --  RR: 18 (05 Jun 2019 16:34) (18 - 18)  SpO2: 93% (06 Jun 2019 04:51) (92% - 94%)      PHYSICAL EXAM-    Constitutional: no acute distress  elderly female     Head: Head is normocephalic and atraumatic.      Neck: no JVD    Cardiovascular: Regular rate and rhythm without S3, S4. No murmurs or rubs are appreciated.      Respiratory: Breath sounds decreased at left base    Abdomen: Soft, nontender, nondistended with positive bowel sounds.      Extremity: No tenderness. no  pitting pedal edema- improving   b/l in LE, chronic skin changes    Neurologic: The patient is alert and oriented.      Skin: No rash, no obvious lesions noted.      Psychiatric: The patient appears to be emotionally stable.      INTERPRETATION OF TELEMETRY:  SR     ECG: Sinus rythm , L axis, PACs and PVCs isolated.    I&O's Detail    02 Jun 2019 07:01  -  03 Jun 2019 07:00  --------------------------------------------------------  IN:  Total IN: 0 mL    OUT:    Voided: 1400 mL  Total OUT: 1400 mL    Total NET: -1400 mL                        12.9   7.97  )-----------( 566      ( 06 Jun 2019 06:58 )             36.7     06-06    118<LL>  |  78<L>  |  8   ----------------------------<  103<H>  3.2<L>   |  33<H>  |  0.57    Ca    8.9      06 Jun 2019 06:58    TPro  5.8<L>  /  Alb  2.5<L>  /  TBili  0.5  /  DBili  x   /  AST  15  /  ALT  25  /  AlkPhos  272<H>  06-05        LIVER FUNCTIONS - ( 05 Jun 2019 07:20 )  Alb: 2.5 g/dL / Pro: 5.8 gm/dL / ALK PHOS: 272 U/L / ALT: 25 U/L / AST: 15 U/L / GGT: x                       I&O's Summary    02 Jun 2019 07:01  -  03 Jun 2019 07:00  --------------------------------------------------------  IN: 0 mL / OUT: 1400 mL / NET: -1400 mL      BNPSerum Pro-Brain Natriuretic Peptide: 1855 pg/mL (06-02 @ 14:32)    RADIOLOGY & ADDITIONAL STUDIES:  < from: Xray Chest 1 View-PORTABLE IMMEDIATE (06.02.19 @ 15:16) >    EXAM:  XR CHEST PORTABLE IMMED 1V                            PROCEDURE DATE:  06/02/2019          INTERPRETATION:  AP semierect chest on June 2, 2019 at 3:10 PM. Patient   has cough and chest pain for 2 weeks.    COMPARISON: None available.    Heart size cannot be assessed.    There is a moderate left effusion.    There is a small amount a right base infiltrate.    IMPRESSION: Bibasilar chest findings as above.                TREVOR LIRA   This document has been electronically signed. Jun 2 2019  3:40PM              < end of copied text >  < from: Transthoracic Echocardiogram (06.03.19 @ 14:09) >     EXAM:  ECHO TTE WO CON COMP W DOP         PROCEDURE DATE:  06/03/2019        INTERPRETATION:  Transthoracic Echocardiography Report (TTE)     Demographics     Patient name        MAYRA NORMAN  Age           84 year(s)     Med Rec #           [id number]         Gender        Female     Account #           [id number]           Date of Birth 11/04/1934     Interpreting        Michael Dick,   Room Number   0333   Physician           MD Michael Dick MD     Referring Physician naren Zheng   Sonographer   Loren King                                                       [facility]     Date of study       06/03/2019 01:44                       PM     Height              68.9 in           Weight       149.92 pounds    Type of Study:     TTE procedure: ECHO TTE WO CON COMP W DOP     BP: 125/65 mmHg     Study Location: 3ETechnical Quality: Fair    Indications   1) I50.9 - Heart failure    M-Mode Measurements (cm)     LVEDd: 5.79 cmLVESd: 3.98 cm   IVSEd: 1.3 cm   LVPWd: 1 cm               AO Root Dimension: 3.6 cm                             ACS: 2.6 cm    Doppler Measurements:                                    MV Peak E-Wave: 91.8 cm/s   TR Velocity:282 cm/s           MV Peak A-Wave: 58.7 cm/s   TR Gradient:31.8096 mmHg       MV E/A Ratio: 1.56 %   Estimated RAP:10 mmHg          MV Peak Gradient: 3.37 mmHg   RVSP:42 mmHg     Findings     Mitral Valve   Mild (1+) mitral regurgitation is present.     Aortic Valve   Mild aortic sclerosis is present with normal valvular opening.   Mild aortic regurgitation is present.     Tricuspid Valve   Mild (1+) tricuspid valve regurgitation is present.   The estimated RVSP is 37 mmHg.     Pulmonic Valve   Normal appearing pulmonic valve structure and function.   Trace pulmonic valvular regurgitation is present.     Left Atrium   The left atrium is moderately dilated.     Left Ventricle   The left ventricle is normal in size, wall motion and contractility.   Mild asymmetrical septal left ventricular hypertrophy is present.   Estimated left ventricular ejection fraction is 60-65 %.     Right Atrium   The right atrium appears mildly dilated.     Right Ventricle   Normal appearing right ventricle structure and function.     Pericardial Effusion   A small pericardial effusion is present.     Pleural Effusion   Pleural effusion - is present.     Miscellaneous   Dilatation of the ascending aorta is noted at 4.4 cm diameter.     Impression     Summary     The left ventricle is normal in size, wall motion and contractility.   Mild asymmetrical septal left ventricular hypertrophy is present.   Estimated left ventricular ejection fraction is 60-65 %.   Dilatation of the ascending aorta is noted at 4.4 cm diameter.   A small pericardial effusion is present.   Pleural effusion - is present.   Mild aortic sclerosis is present with normal valvular opening.   Mild aortic regurgitation is present.   Mild (1+) tricuspid valve regurgitation is present.   The estimated RVSP is 37 mmHg.     Signature     ----------------------------------------------------------------   Electronically signed by Michael Dick MD(Interpreting   physician) on 06/03/2019 03:54 PM    < end of copied text >
Patient is a 84y old  Female who presents with a chief complaint of Shortness of breath and cough.      HPI:  85 y/o female with PMHx of HTN and allergic rhinitis that presents to the ED with complaints of  progressive shortness of breath and lower extremity swelling.  Patient states that these symptoms have been worsening over the last 1 month.  She states that she was seen by a Cardiologist at [facility] and was told that she has "aortic enlargement"  She states that she has been placed on Lasix but has not taken the medication due to urticaria.  Patient denies fevers and chills but does complain of chest pain with dry cough which she has had over the last 3 weeks.  Denies other symptoms.    ED course  - patient found to have elevated BNP with CXR findings significant for left pleural effusion and right infiltrate   BP elevated in ED.  Given 40 lasix IVP     6/3- pt seen and examined this am and she still c/o dry cough.  c/o pedal edema.   Denies any history of CHF in the past.     6/4- pt seen and examined by me today. Pt denies any CP but still c/o cough.     6/6-  Pt seen and examined by me today. She c/o dizziness - like feeling of faint lying in bed.    6/7- pt seen and examined by me today. Pt states that the dizziness is improving.  still c/o constipated.         PAST MEDICAL & SURGICAL HISTORY:  No pertinent past medical history  No significant past surgical history      MEDICATIONS  (STANDING):  furosemide   Injectable 40 milliGRAM(s) IV Push every 12 hours  heparin  Injectable 5000 Unit(s) SubCutaneous every 12 hours  lisinopril 2.5 milliGRAM(s) Oral daily  metoprolol tartrate 25 milliGRAM(s) Oral every 8 hours    MEDICATIONS  (PRN):      FAMILY HISTORY:  No pertinent family history in first degree relatives      SOCIAL HISTORY:  no recent smoking     REVIEW OF SYSTEMS:  CONSTITUTIONAL:    No fatigue, malaise, lethargy.  No fever or chills. c/o dizziness  HEENT:  Eyes:  No visual changes.     ENT:  No epistaxis.  No sinus pain.    RESPIRATORY:  c/o cough.  No wheeze.  No hemoptysis.  no shortness of breath.  CARDIOVASCULAR:  No chest pains.  No palpitations. No shortness of breath, No orthopnea or PND. no pedal edema  GASTROINTESTINAL:  No abdominal pain.  No nausea or vomiting.    GENITOURINARY:    No hematuria.    MUSCULOSKELETAL:  No musculoskeletal pain.  No joint swelling.  No arthritis.  NEUROLOGICAL:  No tingling or numbness or weakness.  PSYCHIATRIC:  No confusion      ICU Vital Signs Last 24 Hrs  T(C): 36.5 (07 Jun 2019 05:08), Max: 37 (06 Jun 2019 11:03)  T(F): 97.7 (07 Jun 2019 05:08), Max: 98.6 (06 Jun 2019 11:03)  HR: 69 (07 Jun 2019 05:08) (69 - 76)  BP: 142/81 (07 Jun 2019 05:08) (114/67 - 142/81)  BP(mean): --  ABP: --  ABP(mean): --  RR: 18 (06 Jun 2019 17:40) (18 - 18)  SpO2: 97% (07 Jun 2019 05:08) (91% - 97%)      PHYSICAL EXAM-    Constitutional: no acute distress  elderly female     Head: Head is normocephalic and atraumatic.      Neck: no JVD    Cardiovascular: Regular rate and rhythm without S3, S4. No murmurs or rubs are appreciated.      Respiratory: Breath sounds decreased at left base    Abdomen: Soft, nontender, nondistended with positive bowel sounds.      Extremity: No tenderness. no  pitting pedal edema- improving   b/l in LE, chronic skin changes    Neurologic: The patient is alert and oriented.      Skin: No rash, no obvious lesions noted.      Psychiatric: The patient appears to be emotionally stable.      INTERPRETATION OF TELEMETRY:  SR     ECG: Sinus rythm , L axis, PACs and PVCs isolated.    I&O's Detail    02 Jun 2019 07:01  -  03 Jun 2019 07:00  --------------------------------------------------------  IN:  Total IN: 0 mL    OUT:    Voided: 1400 mL  Total OUT: 1400 mL    Total NET: -1400 mL                                 12.9   7.97  )-----------( 566      ( 06 Jun 2019 06:58 )             36.7     06-06    119<LL>  |  80<L>  |  9   ----------------------------<  115<H>  4.0   |  30  |  0.53    Ca    8.1<L>      06 Jun 2019 16:49  Phos  2.6     06-06    TPro  x   /  Alb  2.7<L>  /  TBili  x   /  DBili  x   /  AST  x   /  ALT  x   /  AlkPhos  x   06-06        LIVER FUNCTIONS - ( 06 Jun 2019 16:49 )  Alb: 2.7 g/dL / Pro: x     / ALK PHOS: x     / ALT: x     / AST: x     / GGT: x                               I&O's Summary    02 Jun 2019 07:01  -  03 Jun 2019 07:00  --------------------------------------------------------  IN: 0 mL / OUT: 1400 mL / NET: -1400 mL      BNPSerum Pro-Brain Natriuretic Peptide: 1855 pg/mL (06-02 @ 14:32)    RADIOLOGY & ADDITIONAL STUDIES:  < from: Xray Chest 1 View-PORTABLE IMMEDIATE (06.02.19 @ 15:16) >    EXAM:  XR CHEST PORTABLE IMMED 1V                            PROCEDURE DATE:  06/02/2019          INTERPRETATION:  AP semierect chest on June 2, 2019 at 3:10 PM. Patient   has cough and chest pain for 2 weeks.    COMPARISON: None available.    Heart size cannot be assessed.    There is a moderate left effusion.    There is a small amount a right base infiltrate.    IMPRESSION: Bibasilar chest findings as above.                TREVOR LIRA   This document has been electronically signed. Jun 2 2019  3:40PM              < end of copied text >  < from: Transthoracic Echocardiogram (06.03.19 @ 14:09) >     EXAM:  ECHO TTE WO CON COMP W DOP         PROCEDURE DATE:  06/03/2019        INTERPRETATION:  Transthoracic Echocardiography Report (TTE)     Demographics     Patient name        MAYRA NORMAN  Age           84 year(s)     Med Rec #           [id number]         Gender        Female     Account #           [id number]           Date of Birth 11/04/1934     Interpreting        Michael Dick,   Room Number   0333   Physician           MD Michael Dick MD     Referring Physician naren Zheng   Sonographer   Loren King                                                       [facility]     Date of study       06/03/2019 01:44                       PM     Height              68.9 in           Weight       149.92 pounds    Type of Study:     TTE procedure: ECHO TTE WO CON COMP W DOP     BP: 125/65 mmHg     Study Location: 3ETechnical Quality: Fair    Indications   1) I50.9 - Heart failure    M-Mode Measurements (cm)     LVEDd: 5.79 cmLVESd: 3.98 cm   IVSEd: 1.3 cm   LVPWd: 1 cm               AO Root Dimension: 3.6 cm                             ACS: 2.6 cm    Doppler Measurements:                                    MV Peak E-Wave: 91.8 cm/s   TR Velocity:282 cm/s           MV Peak A-Wave: 58.7 cm/s   TR Gradient:31.8096 mmHg       MV E/A Ratio: 1.56 %   Estimated RAP:10 mmHg          MV Peak Gradient: 3.37 mmHg   RVSP:42 mmHg     Findings     Mitral Valve   Mild (1+) mitral regurgitation is present.     Aortic Valve   Mild aortic sclerosis is present with normal valvular opening.   Mild aortic regurgitation is present.     Tricuspid Valve   Mild (1+) tricuspid valve regurgitation is present.   The estimated RVSP is 37 mmHg.     Pulmonic Valve   Normal appearing pulmonic valve structure and function.   Trace pulmonic valvular regurgitation is present.     Left Atrium   The left atrium is moderately dilated.     Left Ventricle   The left ventricle is normal in size, wall motion and contractility.   Mild asymmetrical septal left ventricular hypertrophy is present.   Estimated left ventricular ejection fraction is 60-65 %.     Right Atrium   The right atrium appears mildly dilated.     Right Ventricle   Normal appearing right ventricle structure and function.     Pericardial Effusion   A small pericardial effusion is present.     Pleural Effusion   Pleural effusion - is present.     Miscellaneous   Dilatation of the ascending aorta is noted at 4.4 cm diameter.     Impression     Summary     The left ventricle is normal in size, wall motion and contractility.   Mild asymmetrical septal left ventricular hypertrophy is present.   Estimated left ventricular ejection fraction is 60-65 %.   Dilatation of the ascending aorta is noted at 4.4 cm diameter.   A small pericardial effusion is present.   Pleural effusion - is present.   Mild aortic sclerosis is present with normal valvular opening.   Mild aortic regurgitation is present.   Mild (1+) tricuspid valve regurgitation is present.   The estimated RVSP is 37 mmHg.     Signature     ----------------------------------------------------------------   Electronically signed by Michael Dick MD(Interpreting   physician) on 06/03/2019 03:54 PM    < end of copied text >
Patient is a 84y old  Female who presents with a chief complaint of Shortness of breath and cough.      HPI:  85 y/o female with PMHx of HTN and allergic rhinitis that presents to the ED with complaints of  progressive shortness of breath and lower extremity swelling.  Patient states that these symptoms have been worsening over the last 1 month.  She states that she was seen by a Cardiologist at [facility] and was told that she has "aortic enlargement"  She states that she has been placed on Lasix but has not taken the medication due to urticaria.  Patient denies fevers and chills but does complain of chest pain with dry cough which she has had over the last 3 weeks.  Denies other symptoms.    ED course  - patient found to have elevated BNP with CXR findings significant for left pleural effusion and right infiltrate   BP elevated in ED.  Given 40 lasix IVP     6/3- pt seen and examined this am and she still c/o dry cough.  c/o pedal edema.   Denies any history of CHF in the past.     6/4- pt seen and examined by me today. Pt denies any CP but still c/o cough.     6/6-  Pt seen and examined by me today. She c/o dizziness - like feeling of faint lying in bed.    6/7- pt seen and examined by me today. Pt states that the dizziness is improving.  still c/o constipated.     6/8-pt seen and examined by me today. Pt denies any dizziness.     6/9- dizziness resolved. NO overnight events.  pt seen and examined by me this am .    PAST MEDICAL & SURGICAL HISTORY:  No pertinent past medical history  No significant past surgical history      MEDICATIONS  (STANDING):  furosemide   Injectable 40 milliGRAM(s) IV Push every 12 hours  heparin  Injectable 5000 Unit(s) SubCutaneous every 12 hours  lisinopril 2.5 milliGRAM(s) Oral daily  metoprolol tartrate 25 milliGRAM(s) Oral every 8 hours    MEDICATIONS  (PRN):      FAMILY HISTORY:  No pertinent family history in first degree relatives      SOCIAL HISTORY:  no recent smoking     REVIEW OF SYSTEMS:  CONSTITUTIONAL:    No fatigue, malaise, lethargy.  No fever or chills. c/o dizziness  HEENT:  Eyes:  No visual changes.     ENT:  No epistaxis.  No sinus pain.    RESPIRATORY:  c/o cough.  No wheeze.  No hemoptysis.  no shortness of breath.  CARDIOVASCULAR:  No chest pains.  No palpitations. No shortness of breath, No orthopnea or PND. no pedal edema  GASTROINTESTINAL:  No abdominal pain.  No nausea or vomiting.    GENITOURINARY:    No hematuria.    MUSCULOSKELETAL:  No musculoskeletal pain.  No joint swelling.  No arthritis.  NEUROLOGICAL:  No tingling or numbness or weakness.  PSYCHIATRIC:  No confusion    ICU Vital Signs Last 24 Hrs  T(C): 36.6 (09 Jun 2019 04:23), Max: 36.6 (09 Jun 2019 04:23)  T(F): 97.9 (09 Jun 2019 04:23), Max: 97.9 (09 Jun 2019 04:23)  HR: 60 (09 Jun 2019 04:23) (60 - 73)  BP: 125/58 (09 Jun 2019 04:23) (104/52 - 143/89)  BP(mean): --  ABP: --  ABP(mean): --  RR: 17 (09 Jun 2019 04:23) (17 - 18)  SpO2: 92% (09 Jun 2019 04:23) (91% - 96%)          PHYSICAL EXAM-    Constitutional: no acute distress  elderly female     Head: Head is normocephalic and atraumatic.      Neck: no JVD    Cardiovascular: Regular rate and rhythm without S3, S4. No murmurs or rubs are appreciated.      Respiratory: Breath sounds decreased at left base    Abdomen: Soft, nontender, nondistended with positive bowel sounds.      Extremity: No tenderness. no  pitting pedal edema- improving   b/l in LE, chronic skin changes    Neurologic: The patient is alert and oriented.      Skin: No rash, no obvious lesions noted.      Psychiatric: The patient appears to be emotionally stable.      INTERPRETATION OF TELEMETRY:  SR     ECG: Sinus rythm , L axis, PACs and PVCs isolated.    I&O's Detail    02 Jun 2019 07:01  -  03 Jun 2019 07:00  --------------------------------------------------------  IN:  Total IN: 0 mL    OUT:    Voided: 1400 mL  Total OUT: 1400 mL    Total NET: -1400 mL    06-07    119<LL>  |  82<L>  |  8   ----------------------------<  104<H>  4.1   |  32<H>  |  0.56    Ca    8.7      07 Jun 2019 10:41  Phos  2.6     06-07    TPro  x   /  Alb  2.7<L>  /  TBili  x   /  DBili  x   /  AST  x   /  ALT  x   /  AlkPhos  x   06-07        LIVER FUNCTIONS - ( 07 Jun 2019 10:41 )  Alb: 2.7 g/dL / Pro: x     / ALK PHOS: x     / ALT: x     / AST: x     / GGT: x                   LIVER FUNCTIONS - ( 06 Jun 2019 16:49 )  Alb: 2.7 g/dL / Pro: x     / ALK PHOS: x     / ALT: x     / AST: x     / GGT: x                               I&O's Summary    02 Jun 2019 07:01  -  03 Jun 2019 07:00  --------------------------------------------------------  IN: 0 mL / OUT: 1400 mL / NET: -1400 mL      BNPSerum Pro-Brain Natriuretic Peptide: 1855 pg/mL (06-02 @ 14:32)    RADIOLOGY & ADDITIONAL STUDIES:  < from: Xray Chest 1 View-PORTABLE IMMEDIATE (06.02.19 @ 15:16) >    EXAM:  XR CHEST PORTABLE IMMED 1V                            PROCEDURE DATE:  06/02/2019          INTERPRETATION:  AP semierect chest on June 2, 2019 at 3:10 PM. Patient   has cough and chest pain for 2 weeks.    COMPARISON: None available.    Heart size cannot be assessed.    There is a moderate left effusion.    There is a small amount a right base infiltrate.    IMPRESSION: Bibasilar chest findings as above.                TREVOR LIRA   This document has been electronically signed. Jun 2 2019  3:40PM              < end of copied text >  < from: Transthoracic Echocardiogram (06.03.19 @ 14:09) >     EXAM:  ECHO TTE WO CON COMP W DOP         PROCEDURE DATE:  06/03/2019        INTERPRETATION:  Transthoracic Echocardiography Report (TTE)     Demographics     Patient name        MAYRA NORMAN  Age           84 year(s)     Med Rec #           [id number]         Gender        Female     Account #           [id number]           Date of Birth 11/04/1934     Interpreting        Michael Dick,   Room Number   0333   Physician           MD Michael Dick MD     Referring Physician naren Zheng   Sonographer   Loren King                                                       [facility]     Date of study       06/03/2019 01:44                       PM     Height              68.9 in           Weight       149.92 pounds    Type of Study:     TTE procedure: ECHO TTE WO CON COMP W DOP     BP: 125/65 mmHg     Study Location: [employer] Quality: Fair    Indications   1) I50.9 - Heart failure    M-Mode Measurements (cm)     LVEDd: 5.79 cmLVESd: 3.98 cm   IVSEd: 1.3 cm   LVPWd: 1 cm               AO Root Dimension: 3.6 cm                             ACS: 2.6 cm    Doppler Measurements:                                    MV Peak E-Wave: 91.8 cm/s   TR Velocity:282 cm/s           MV Peak A-Wave: 58.7 cm/s   TR Gradient:31.8096 mmHg       MV E/A Ratio: 1.56 %   Estimated RAP:10 mmHg          MV Peak Gradient: 3.37 mmHg   RVSP:42 mmHg     Findings     Mitral Valve   Mild (1+) mitral regurgitation is present.     Aortic Valve   Mild aortic sclerosis is present with normal valvular opening.   Mild aortic regurgitation is present.     Tricuspid Valve   Mild (1+) tricuspid valve regurgitation is present.   The estimated RVSP is 37 mmHg.     Pulmonic Valve   Normal appearing pulmonic valve structure and function.   Trace pulmonic valvular regurgitation is present.     Left Atrium   The left atrium is moderately dilated.     Left Ventricle   The left ventricle is normal in size, wall motion and contractility.   Mild asymmetrical septal left ventricular hypertrophy is present.   Estimated left ventricular ejection fraction is 60-65 %.     Right Atrium   The right atrium appears mildly dilated.     Right Ventricle   Normal appearing right ventricle structure and function.     Pericardial Effusion   A small pericardial effusion is present.     Pleural Effusion   Pleural effusion - is present.     Miscellaneous   Dilatation of the ascending aorta is noted at 4.4 cm diameter.     Impression     Summary     The left ventricle is normal in size, wall motion and contractility.   Mild asymmetrical septal left ventricular hypertrophy is present.   Estimated left ventricular ejection fraction is 60-65 %.   Dilatation of the ascending aorta is noted at 4.4 cm diameter.   A small pericardial effusion is present.   Pleural effusion - is present.   Mild aortic sclerosis is present with normal valvular opening.   Mild aortic regurgitation is present.   Mild (1+) tricuspid valve regurgitation is present.   The estimated RVSP is 37 mmHg.     Signature     ----------------------------------------------------------------   Electronically signed by Michael Dick MD(Interpreting   physician) on 06/03/2019 03:54 PM    < end of copied text >
Subjective:    pat better, lying in bed flat, low Na 119. pat was given iv NS.    Home Medications:  aspirin 325 mg oral tablet: 1 tab(s) orally once a day, As Needed pain (03 Jun 2019 11:24)  Melatonin 3 mg oral tablet: 1 tab(s) orally once (at bedtime), As Needed (03 Jun 2019 11:24)    MEDICATIONS  (STANDING):  azithromycin  IVPB      azithromycin  IVPB 500 milliGRAM(s) IV Intermittent every 24 hours  cefTRIAXone Injectable. 1000 milliGRAM(s) IV Push every 24 hours  cefTRIAXone Injectable.      guaiFENesin ER 1200 milliGRAM(s) Oral every 12 hours  heparin  Injectable 5000 Unit(s) SubCutaneous every 12 hours  metoprolol tartrate 25 milliGRAM(s) Oral every 8 hours    MEDICATIONS  (PRN):  HYDROcodone/homatropine Syrup 5 milliLiter(s) Oral every 6 hours PRN Cough  magnesium hydroxide Suspension 30 milliLiter(s) Oral daily PRN Constipation      Allergies    No Known Allergies    Intolerances        Vital Signs Last 24 Hrs  T(C): 36.5 (07 Jun 2019 05:08), Max: 37 (06 Jun 2019 11:03)  T(F): 97.7 (07 Jun 2019 05:08), Max: 98.6 (06 Jun 2019 11:03)  HR: 69 (07 Jun 2019 05:08) (69 - 76)  BP: 142/81 (07 Jun 2019 05:08) (114/67 - 142/81)  BP(mean): --  RR: 18 (06 Jun 2019 17:40) (18 - 18)  SpO2: 97% (07 Jun 2019 05:08) (91% - 97%)      PHYSICAL EXAMINATION:    NECK:  Supple. No lymphadenopathy. Jugular venous pressure not elevated. Carotids equal.   HEART:   The cardiac impulse has a normal quality. Reg., Nl S1 and S2.  There are no murmurs, rubs or gallops noted  CHEST:  Chest is clear to auscultation. Normal respiratory effort.  ABDOMEN:  Soft and nontender.   EXTREMITIES:  There is no edema.       LABS:                        12.9   7.97  )-----------( 566      ( 06 Jun 2019 06:58 )             36.7     06-06    119<LL>  |  80<L>  |  9   ----------------------------<  115<H>  4.0   |  30  |  0.53    Ca    8.1<L>      06 Jun 2019 16:49  Phos  2.6     06-06    TPro  x   /  Alb  2.7<L>  /  TBili  x   /  DBili  x   /  AST  x   /  ALT  x   /  AlkPhos  x   06-06
Subjective:    pat c/o feeling dizzy, lying in bed.    Home Medications:  aspirin 325 mg oral tablet: 1 tab(s) orally once a day, As Needed pain (03 Jun 2019 11:24)  Melatonin 3 mg oral tablet: 1 tab(s) orally once (at bedtime), As Needed (03 Jun 2019 11:24)    MEDICATIONS  (STANDING):  azithromycin  IVPB      azithromycin  IVPB 500 milliGRAM(s) IV Intermittent every 24 hours  cefTRIAXone Injectable. 1000 milliGRAM(s) IV Push every 24 hours  cefTRIAXone Injectable.      guaiFENesin ER 1200 milliGRAM(s) Oral every 12 hours  heparin  Injectable 5000 Unit(s) SubCutaneous every 12 hours  metoprolol tartrate 25 milliGRAM(s) Oral every 8 hours  polyethylene glycol 3350 17 Gram(s) Oral every 12 hours  potassium chloride    Tablet ER 40 milliEquivalent(s) Oral every 4 hours  sodium chloride 0.9%. 1000 milliLiter(s) (50 mL/Hr) IV Continuous <Continuous>    MEDICATIONS  (PRN):  HYDROcodone/homatropine Syrup 5 milliLiter(s) Oral every 6 hours PRN Cough  magnesium hydroxide Suspension 30 milliLiter(s) Oral daily PRN Constipation      Allergies    No Known Allergies    Intolerances        Vital Signs Last 24 Hrs  T(C): 36.7 (06 Jun 2019 04:51), Max: 36.7 (05 Jun 2019 16:34)  T(F): 98 (06 Jun 2019 04:51), Max: 98.1 (05 Jun 2019 16:34)  HR: 66 (06 Jun 2019 04:51) (66 - 88)  BP: 122/55 (06 Jun 2019 04:51) (113/62 - 131/92)  BP(mean): --  RR: 18 (05 Jun 2019 16:34) (18 - 18)  SpO2: 93% (06 Jun 2019 04:51) (92% - 94%)      PHYSICAL EXAMINATION:    NECK:  Supple. No lymphadenopathy. Jugular venous pressure not elevated. Carotids equal.   HEART:   The cardiac impulse has a normal quality. Reg., Nl S1 and S2.  There are no murmurs, rubs or gallops noted  CHEST:  Chest crackles to auscultation. Normal respiratory effort.  ABDOMEN:  Soft and nontender.   EXTREMITIES:  There is no edema.       LABS:                        12.9   7.97  )-----------( 566      ( 06 Jun 2019 06:58 )             36.7     06-06    118<LL>  |  78<L>  |  8   ----------------------------<  103<H>  3.2<L>   |  33<H>  |  0.57    Ca    8.9      06 Jun 2019 06:58    TPro  5.8<L>  /  Alb  2.5<L>  /  TBili  0.5  /  DBili  x   /  AST  15  /  ALT  25  /  AlkPhos  272<H>  06-05
Subjective:    pat better, sitting in chair, no respiratory distress.    Home Medications:  aspirin 325 mg oral tablet: 1 tab(s) orally once a day, As Needed pain (03 Jun 2019 11:24)  Melatonin 3 mg oral tablet: 1 tab(s) orally once (at bedtime), As Needed (03 Jun 2019 11:24)    MEDICATIONS  (STANDING):  azithromycin  IVPB      azithromycin  IVPB 500 milliGRAM(s) IV Intermittent every 24 hours  cefTRIAXone Injectable. 1000 milliGRAM(s) IV Push every 24 hours  cefTRIAXone Injectable.      guaiFENesin ER 1200 milliGRAM(s) Oral every 12 hours  heparin  Injectable 5000 Unit(s) SubCutaneous every 12 hours  metoprolol tartrate 25 milliGRAM(s) Oral every 8 hours  sodium chloride 1 Gram(s) Oral two times a day  sodium chloride 0.9%. 1000 milliLiter(s) (50 mL/Hr) IV Continuous <Continuous>    MEDICATIONS  (PRN):  HYDROcodone/homatropine Syrup 5 milliLiter(s) Oral every 6 hours PRN Cough  magnesium hydroxide Suspension 30 milliLiter(s) Oral daily PRN Constipation      Allergies    No Known Allergies    Intolerances        Vital Signs Last 24 Hrs  T(C): 36.6 (09 Jun 2019 10:07), Max: 36.6 (09 Jun 2019 04:23)  T(F): 97.8 (09 Jun 2019 10:07), Max: 97.9 (09 Jun 2019 04:23)  HR: 68 (09 Jun 2019 10:07) (60 - 73)  BP: 115/68 (09 Jun 2019 10:07) (104/52 - 143/89)  BP(mean): --  RR: 18 (09 Jun 2019 10:07) (17 - 18)  SpO2: 99% (09 Jun 2019 10:07) (91% - 99%)      PHYSICAL EXAMINATION:    NECK:  Supple. No lymphadenopathy. Jugular venous pressure not elevated. Carotids equal.   HEART:   The cardiac impulse has a normal quality. Reg., Nl S1 and S2.  There are no murmurs, rubs or gallops noted  CHEST:  Chest crackles to auscultation. Normal respiratory effort.  ABDOMEN:  Soft and nontender.   EXTREMITIES:  There is no edema.       LABS:    06-09    128<L>  |  91<L>  |  6<L>  ----------------------------<  97  4.1   |  31  |  0.58    Ca    8.6      09 Jun 2019 07:10  Phos  3.5     06-09    TPro  x   /  Alb  2.7<L>  /  TBili  x   /  DBili  x   /  AST  x   /  ALT  x   /  AlkPhos  x   06-09
HOSPITALIST ATTENDING PROGRESS NOTE    Chart and meds reviewed.  Patient seen and examined.    HPI: 85 y/o female with PMHx of HTN and allergic rhinitis that presents to the ED with complaints of  progressive shortness of breath and lower extremity swelling.  Patient states that these symptoms have been worsening over the last 1 month.  She states that she was seen by a Cardiologist at [facility] and was told that she has "atrial enlargement"  She states that she has been placed on Lasix but has not taken the medication due to urticaria.  Patient denies fevers and chills but does complain of chest pain with dry cough which she has had over the last 3 weeks.  Denies other symptoms.    6/4/19 pt seen and examined, coughing alot, unable to sleep, uncomfortable, no sputum. CXR reviewed with possible infiltrates. CT chest noted. Started abx.     6/5/19 pt seen and examined, c/o not having a BM since past 7 days, some mild abd discomfort with food, passing flatus.     6/6/19 a little dizzy, not able to eat much, some nausea, no BM yesterday, d/w  and  today.     6/7/19 pt seen and examined, s/p BM this AM, feels better, no nausea, was able to eat. Na still low. Trial of IVF today as well.     6/8/19 pt seen and examined, Na imroved but low, not at baseline, feels better otherwise. discussed with  today    All 10 systems reviewed and found to be negative with the exception of what has been described above.    MEDICATIONS  (STANDING):  azithromycin  IVPB      azithromycin  IVPB 500 milliGRAM(s) IV Intermittent every 24 hours  cefTRIAXone Injectable. 1000 milliGRAM(s) IV Push every 24 hours  cefTRIAXone Injectable.      guaiFENesin ER 1200 milliGRAM(s) Oral every 12 hours  heparin  Injectable 5000 Unit(s) SubCutaneous every 12 hours  metoprolol tartrate 25 milliGRAM(s) Oral every 8 hours  sodium chloride 0.9%. 1000 milliLiter(s) (50 mL/Hr) IV Continuous <Continuous>    MEDICATIONS  (PRN):  HYDROcodone/homatropine Syrup 5 milliLiter(s) Oral every 6 hours PRN Cough  magnesium hydroxide Suspension 30 milliLiter(s) Oral daily PRN Constipation      VITALS:  T(F): 97.4 (06-08-19 @ 11:28), Max: 97.9 (06-07-19 @ 16:32)  HR: 73 (06-08-19 @ 13:18) (66 - 73)  BP: 115/52 (06-08-19 @ 13:18) (115/52 - 143/75)  RR: 18 (06-08-19 @ 11:28) (18 - 19)  SpO2: 96% (06-08-19 @ 11:28) (92% - 96%)  Wt(kg): --    I&O's Summary    07 Jun 2019 07:01  -  08 Jun 2019 07:00  --------------------------------------------------------  IN: 800 mL / OUT: 0 mL / NET: 800 mL        CAPILLARY BLOOD GLUCOSE          PHYSICAL EXAM:    HEENT:  pupils equal and reactive, EOMI, no oropharyngeal lesions, erythema, exudates, oral thrush  NECK:   supple, no carotid bruits, no palpable lymph nodes, no thyromegaly  CV:  +S1, +S2, regular, no murmurs or rubs  RESP:   lungs clear to auscultation bilaterally, no wheezing, rales, rhonchi, good air entry bilaterally  BREAST:  not examined  GI:  abdomen soft, non-tender, non-distended, normal BS, no bruits, no abdominal masses, no palpable masses  RECTAL:  not examined  :  not examined  MSK:   normal muscle tone, no atrophy, no rigidity, no contractions  EXT:  no clubbing, no cyanosis, no edema, no calf pain, swelling or erythema  VASCULAR:  pulses equal and symmetric in the upper and lower extremities  NEURO:  AAOX3, no focal neurological deficits, follows all commands, able to move extremities spontaneously  SKIN:  no ulcers, lesions or rashes    LABS:        06-08    122<L>  |  85<L>  |  6<L>  ----------------------------<  97  3.7   |  31  |  0.57    Ca    8.9      08 Jun 2019 06:56  Phos  2.9     06-08    TPro  x   /  Alb  3.0<L>  /  TBili  x   /  DBili  x   /  AST  x   /  ALT  x   /  AlkPhos  x   06-08        LIVER FUNCTIONS - ( 08 Jun 2019 06:56 )  Alb: 3.0 g/dL / Pro: x     / ALK PHOS: x     / ALT: x     / AST: x     / GGT: x                                             CULTURES:

## 2019-06-09 NOTE — PROGRESS NOTE ADULT - ASSESSMENT
83 y/o female with CHF    # Acute on Chronic diastolic Congestive heart failure  # Cough due to CAP and CHF  # HTN  # Chronic Hyponatremia not on any tx  # hx of Constipation  # Dizziness    - daily weights  - IVF trial for Hyponatremia  - DC lasix for now  - lopressor 25 mg PO Q 8 hours with holds for SBP <110 or HR < 60bpm  - echo noted  - Cardiology eval appreciated  - lisinopril 2.5 mg daily.   - on Ceftriaxone/Azithro for CAP Nebs, Cough syrup    Renal follow up for Na, ?Start Na tablets  DC once Na imprvoes
85 yo female with hx of hyponatremia presumed per pt x years, baseline unknown , presenting with Increased cough, LE edema     Acute on  chronic hyponatremia  ( Na 130)    lung pathology ( pna could be exacerbating her chronic state) now becoming clinically hypovolemic after diuresis ??    urine Na 20    Na dropped to 118 while on IV lasix  and poor po intake    NA slighlty improved with 500 ml NS today    TSH - Ok    cortisol - OK    serum uric acid - ok    continue trial of IV saline for volume and monitor x 500ml additional    d.w Dr yang yesterday    encourage osmolar intake     d/w RN     Dr spencer covering weekend     Thank you for the courtesy of this consult. We will follow this patient with you.   Management is subject to change if new information becomes available or patient condition changes.
PROBLEMS:    Congestive heart failure  Cough due to CAP and CHF  Bilateral pleural effusions and pericardial effusion and compressive atelectatic changes in the lower lobes and the   inferior upper lobes- Possible pneumonia in the lower lobes   HTN  Chronic Hyponatremia       PLAN:    monitor Na 122  pulmonary unchanged  IV Ceftriaxone/Azithro   keep neg balance  fu pleural effusion by repeating CXR  supportive care  aerosols  DVT prophylasix
1. Dizziness- no orthostatsis per BP readings this am. Hold lasix for now.    SOB and pedal edema - Acute on chronic decompensated HF- NYHA class II- III  euvolemic on exam. Dizziness.   Hold diuretics for now.  She received am lasix    2. HTN- Bp optimal this am.  Continue current meds.    3. Hyponatremia- worsening.  nephrology team following pt.  Likely multifactorial in origin.    4. Elevated alkaline phosphatase-   Improving but still elevated despite pt being euvolemic.  Recommend imaging of the liver.    5. Pleural effusion- monitor with diuresis and if there is no reduction in size toward end of diuresis     6. Thoracic aortic aneyrsym and small pericardial effusion- advise repeat echo as followup as outpt in 3 months.         Other medical issues- Management per primary team.   Thank you for allowing me to participate in the care of this patient. Please feel free to contact me with any questions.
1. Dizziness- no orthostatsis per BP readings this am. Hold lasix for now. Improving.     SOB and pedal edema - Acute on chronic decompensated HF- NYHA class II- III  euvolemic on exam. Dizziness.   Hold diuretics for now.  Discussed with nephrology team.     2. HTN- Bp optimal this am.  Continue current meds.    3. Hyponatremia- improving.  Discussed with Dr Powers yesterday.      4. Elevated alkaline phosphatase-   Improving but still elevated despite pt being euvolemic.  Recommend imaging of the liver.    5. Pleural effusion- monitor with diuresis and if there is no reduction in size toward end of diuresis     6. Thoracic aortic aneyrsym and small pericardial effusion- advise repeat echo as followup as outpt in 3 months.     Other medical issues- Management per primary team.   Thank you for allowing me to participate in the care of this patient. Please feel free to contact me with any questions.         Other medical issues- Management per primary team.   Thank you for allowing me to participate in the care of this patient. Please feel free to contact me with any questions.
1. Dizziness- resolved per pt. no orthostatsis per BP readings this am. Hold lasix for now. Improving.     SOB and pedal edema - Acute on chronic decompensated HF- NYHA class II- III  euvolemic on exam. Dizziness resolved  Hold diuretics for now.  Discussed with nephrology team.   Compression stockings    2. HTN- Bp optimal this am.  Continue current meds.    3. Hyponatremia- improving but still at low level.  She received some salt tabs. Nephrology team input appreciated.   Pt states that she had some hyponatremia in the past.   Close monitoring of the volume status with the above interventions.        4. Elevated alkaline phosphatase- no results since 6/5, recommend recheck  Improving but still elevated despite pt being euvolemic.  Recommend imaging of the liver.    5. Pleural effusion- monitor with diuresis and if there is no reduction in size toward end of diuresis     6. Thoracic aortic aneyrsym and small pericardial effusion- advise repeat echo as followup as outpt in 3 months.     Other medical issues- Management per primary team.   Thank you for allowing me to participate in the care of this patient. Please feel free to contact me with any questions.         Other medical issues- Management per primary team.   Thank you for allowing me to participate in the care of this patient. Please feel free to contact me with any questions.
1. Dizziness- resolved per pt. no orthostatsis per BP readings this am. Hold lasix for now. Improving.     SOB and pedal edema - Acute on chronic decompensated HF- NYHA class II- III  euvolemic on exam. Dizziness.   Hold diuretics for now.  Discussed with nephrology team.   Compression stockings    2. HTN- Bp optimal this am.  Continue current meds.    3. Hyponatremia- improving but still at low level.  She received some salt tabs and IVF.  Pt states that she had some hyponatremia in the past.   Close monitoring of the volume status with the above interventions.  Discussed with Dr Powers yesterday.      4. Elevated alkaline phosphatase- no results since 6/5, recommend recheck  Improving but still elevated despite pt being euvolemic.  Recommend imaging of the liver.    5. Pleural effusion- monitor with diuresis and if there is no reduction in size toward end of diuresis     6. Thoracic aortic aneyrsym and small pericardial effusion- advise repeat echo as followup as outpt in 3 months.     Other medical issues- Management per primary team.   Thank you for allowing me to participate in the care of this patient. Please feel free to contact me with any questions.         Other medical issues- Management per primary team.   Thank you for allowing me to participate in the care of this patient. Please feel free to contact me with any questions.
1. SOB and pedal edema- hypervolemic - Acute on chronic decompensated HF- NYHA class II- III  Reviewed 2 d echo results.  Check am labs to asses renal function and electrolytes- pending labs  So far cardiac enzymes did not reveal any ischemia.   continue diuresis with lasix iv.  Diuresis with close monitoring of the renal function and electrolytes.  Goal potassium of 4 and magnesium of 2.   Strict I/O and daily wt checks. Low sodium diet. Nutrition education.     2. HTN- Bp optimal this am.  Continue current meds.    3. Hyponatremia- could be combination of volume overload and decreased po intake.  Check am labs today    4. Elevated alkaline phosphatase- imaging of liver is recommended.  Could be from hepatic congestion as well but the elevation is marked.     5. Pleural effusion- monitor with diuresis and if there is no reduction in size toward end of diuresis     6. Thoracic aortic aneyrsym and small pericardial effusion- advise repeat echo as followup as outpt in 3 months.     Other medical issues- Management per primary team.   Thank you for allowing me to participate in the care of this patient. Please feel free to contact me with any questions.
83 y/o female with CHF    # Acute on Chronic diastolic Congestive heart failure  # Cough due to CAP and CHF  # HTN  # Chronic Hyponatremia not on any tx  # hx of Constipation  # Dizziness    - daily weights  - IVF trial for Hyponatremia  - DC lasix for now  - lopressor 25 mg PO Q 8 hours with holds for SBP <110 or HR < 60bpm  - echo noted  - Cardiology eval appreciated  - lisinopril 2.5 mg daily.   - on Ceftriaxone/Azithro for CAP Nebs, Cough syrup      possible DC tomm if Na improves  ambulate as tolerated
83 y/o with chronic hyponatremia with acute worsening due to CHF.  Pt presented with s/p fall pta arrival to hospital    PLAN  - dc lisinopril ( can cause hyponatremia) and fu trend of the bp  - fluid restrict to 32 oz per day   - nacl tablets 1 gm bid and fu response  - for her chf symptoms, will fu clinically for need for diuretics as outpt  Lm with dr saunders
85 y/o female with CHF    # Acute on Chronic diastolic Congestive heart failure  # Cough due to CAP and CHF  # HTN  # Chronic Hyponatremia not on any tx  # hx of Constipation    - daily weights  - fluid restriction for hyponatremia to 1 L, urine lytes, serum Osm, renal eval  - lasix 40 mg IVP Q 12 hours  - lopressor 25 mg PO Q 8 hours with holds for SBP <110 or HR < 60bpm  - echo noted  - Cardiology eval appreciated  - lisinopril 2.5 mg daily.   - on Ceftriaxone/Azithro for CAP Nebs, Cough syrup  - Abd xray r/o obstruction  - Miralax, Milk of magnesia      ambulate as tolerated
85 y/o female with CHF    # Acute on Chronic diastolic Congestive heart failure  # Cough due to CAP and CHF  # HTN  # Chronic Hyponatremia not on any tx  # hx of Constipation  # Dizziness    - daily weights  - fluid restriction for hyponatremia to 1 L  - DC lasix for now  - Trial of IVF for 5 hours to see if Na responds, repeat BMP at 4pm, if responds, provide another 5 hours of IVF  - lopressor 25 mg PO Q 8 hours with holds for SBP <110 or HR < 60bpm  - echo noted  - Cardiology eval appreciated  - lisinopril 2.5 mg daily.   - on Ceftriaxone/Azithro for CAP Nebs, Cough syrup  - Abd xray r/o obstruction  - Miralax, Milk of magnesia --> if no BM --> lactulose tonight      ambulate as tolerated
85 y/o female with CHF    # Congestive heart failure, unspecified HF chronicity, unspecified heart failure type.    # Cough due to CAP and CHF  # HTN  # Chronic Hyponatremia not on any tx    - daily weights  - fluid restriction for hyponatremia to 1.5 L, urine lytes, serum Osm (dr.yohannan culp)  - lasix 40 mg IVP Q 12 hours  - lopressor 25 mg PO Q 8 hours with holds for SBP <110 or HR < 60bpm  - echo  - Cardiology eval appreciated  - lisinopril 2.5 mg daily.   - Start Ceftriaxone/Azithro for CAP, Pulm eval, Nebs, Cough syrup      ambulate as tolerated
85 y/o female with CHF    # Congestive heart failure, unspecified HF chronicity, unspecified heart failure type.    - daily weights  - lasix 40 mg IVP Q 12 hours  - lopressor 25 mg PO Q 8 hours with holds for SBP <110 or HR < 60bpm  - TTE in am  - Cardiology eval.     Problem/Plan - 2:  ·  Problem: Essential hypertension.  Plan: - lopressor as above  - lisinopril 2.5 mg daily.     Problem/Plan - 3:  ·  Problem: Hyponatremia.  Plan: likely secondary to fluid overload  - recheck bmp in am after aggressive diuresisi.     Problem/Plan - 4:  ·  Problem: Elevated alkaline phosphatase measurement.  Plan: - likely secondary to hepatic congestion from CHF  - follow am value.
85 yo female with hx of hyponatremia presumed per pt x years, baseline unknown , presenting with Increased cough, LE edema     Acute on  chronic hyponatremia  ( Na 130)    lung pathology ( pna could be exacerbating her chronic state) now becoming clinically hypovolemic??    Na dropped    TSH - Ok    fup cortisol    serum uric acid - ok    d/w Dr Hutchinson and Dr yang - ok to to give gentle trial of IV saline for volume and monitor    check Na after 5 hours of 50cc/hr of NS - further IVF based on response    d/w RN     Thank you for the courtesy of this consult. We will follow this patient with you.   Management is subject to change if new information becomes available or patient condition changes.
PROBLEMS:    Congestive heart failure  Cough due to CAP and CHF  Bilateral pleural effusions and pericardial effusion and compressive atelectatic changes in the lower lobes and the   inferior upper lobes- Possible pneumonia in the lower lobes   HTN  Chronic Hyponatremia       PLAN:    monitor Na   pulmonary unchanged  IV Ceftriaxone/Azithro   keep neg balance  fu pleural effusion by repeating CXR  supportive care  aerosols  DVT prophylasix
PROBLEMS:    Congestive heart failure  Cough due to CAP and CHF  Bilateral pleural effusions and pericardial effusion and compressive atelectatic changes in the lower lobes and the   inferior upper lobes- Possible pneumonia in the lower lobes   HTN  Chronic Hyponatremia       PLAN:    pulmonary unchanged  IV  Ceftriaxone/Azithro   keep neg balance  fu pleural effusion by repeating CXR  supportive care  aerosols
PROBLEMS:    Congestive heart failure  Cough due to CAP and CHF  Bilateral pleural effusions and pericardial effusion and compressive atelectatic changes in the lower lobes and the   inferior upper lobes- Possible pneumonia in the lower lobes   HTN  Chronic Hyponatremia       PLAN:    monitor Na 128  pulmonary unchanged  IV Ceftriaxone/Azithro   keep neg balance  supportive care  aerosols  DVT prophylasix

## 2019-06-09 NOTE — PROGRESS NOTE ADULT - REASON FOR ADMISSION
Shortness of breath and cough

## 2019-06-09 NOTE — DISCHARGE NOTE PROVIDER - PROVIDER TOKENS
PROVIDER:[TOKEN:[[id number]]],PROVIDER:[TOKEN:[[id number]]],PROVIDER:[TOKEN:[[id number]]]

## 2019-06-09 NOTE — PROGRESS NOTE ADULT - PROVIDER SPECIALTY LIST ADULT
Cardiology
Hospitalist
Nephrology
Nephrology
Pulmonology
Hospitalist
Nephrology
Pulmonology

## 2019-06-09 NOTE — DISCHARGE NOTE PROVIDER - CARE PROVIDER_API CALL
Emmett Reyna)  Critical Care Medicine; Internal Medicine; Pulmonary Disease; Sleep Medicine  [address] [place] [address]  Phone: [phone]  Fax: [phone]  Follow Up Time:     Kianna Shepherd)  Cardiovascular Disease; Internal Medicine  [address] [place] [address]  Phone: [phone]  Fax: [phone]  Follow Up Time:     Cory Guerin)  Internal Medicine  [address] [place] [address]  Phone: [phone]  Fax: [phone]  Follow Up Time:

## 2019-06-09 NOTE — DISCHARGE NOTE PROVIDER - HOSPITAL COURSE
85 y/o female with PMHx of HTN and allergic rhinitis that presents to the ED with complaints of  progressive shortness of breath and lower extremity swelling.  Patient states that these symptoms have been worsening over the last 1 month.  She states that she was seen by a Cardiologist at [facility] and was told that she has "atrial enlargement"  She states that she has been placed on Lasix but has not taken the medication due to urticaria.  Patient denies fevers and chills but does complain of chest pain with dry cough which she has had over the last 3 weeks.  Denies other symptoms.        6/4/19 pt seen and examined, coughing alot, unable to sleep, uncomfortable, no sputum. CXR reviewed with possible infiltrates. CT chest noted. Started abx.         6/5/19 pt seen and examined, c/o not having a BM since past 7 days, some mild abd discomfort with food, passing flatus.         6/6/19 a little dizzy, not able to eat much, some nausea, no BM yesterday, d/w  and  today.         6/7/19 pt seen and examined, s/p BM this AM, feels better, no nausea, was able to eat. Na still low. Trial of IVF today as well.         6/8/19 pt seen and examined, Na imroved but low, not at baseline, feels better otherwise. discussed with  today        6/9/19 pt seen and examined, Na improved to 128. Discussed with renal yesterday, cardio today and pulmonary ysterday.             PHYSICAL EXAM:        Constitutional: NAD, awake and alert, well-developed    HEENT: PERR, EOMI, Normal Hearing, MMM    Neck: Soft and supple, No LAD, No JVD    Respiratory: Breath sounds are clear bilaterally, No wheezing, rales or rhonchi    Cardiovascular: S1 and S2, regular rate and rhythm, no Murmurs, gallops or rubs    Gastrointestinal: Bowel Sounds present, soft, nontender, nondistended, no guarding, no rebound    Extremities: No peripheral edema    Vascular: 2+ peripheral pulses    Neurological: A/O x 3, no focal deficits    Musculoskeletal: 5/5 strength b/l upper and lower extremities    Skin: No rashes        85 y/o female with CHF        # Acute on Chronic diastolic Congestive heart failure    # Cough due to CAP and CHF    # HTN    # Chronic Hyponatremia not on any tx    # hx of Constipation    # Dizziness        - daily weights    - Na improved to baseline with Nacl tablets    - will DC home on daily NaCL tablets, Patient will check on tuesday with BMP with her PMD.    - She is also to request Nephro sun[facility] in [place].  office far for her    - Hold Lasix for now, especially given the recommendation of 32 oz of water    - She can reassess her lasix status with PMD next week.     - lopressor 25 mg PO Q 8 hours with holds for SBP <110 or HR < 60bpm    - echo noted    - Cardiology eval appreciated    - DC lisinopril    - PO ceftin x 3 days for discharge        DC today

## 2019-06-09 NOTE — DISCHARGE NOTE NURSING/CASE MANAGEMENT/SOCIAL WORK - NSDCDPATPORTLINK_GEN_ALL_CORE
You can access the [facility], offered by [facility], by registering with the following website: [email or web address]

## 2019-06-09 NOTE — DISCHARGE NOTE PROVIDER - NSDCCPCAREPLAN_GEN_ALL_CORE_FT
PRINCIPAL DISCHARGE DIAGNOSIS  Diagnosis: Congestive heart failure, unspecified HF chronicity, unspecified heart failure type  Assessment and Plan of Treatment:       SECONDARY DISCHARGE DIAGNOSES  Diagnosis: Concussion without loss of consciousness, initial encounter  Assessment and Plan of Treatment:

## 2019-06-09 NOTE — DISCHARGE NOTE PROVIDER - CARE PROVIDERS DIRECT ADDRESSES
,DirectAddress_Unknown,DirectAddress_Unknown,[email or web address]

## 2019-06-13 DIAGNOSIS — Z79.82 LONG TERM (CURRENT) USE OF ASPIRIN: ICD-10-CM

## 2019-06-13 DIAGNOSIS — I50.33 ACUTE ON CHRONIC DIASTOLIC (CONGESTIVE) HEART FAILURE: ICD-10-CM

## 2019-06-13 DIAGNOSIS — Z79.899 OTHER LONG TERM (CURRENT) DRUG THERAPY: ICD-10-CM

## 2019-06-13 DIAGNOSIS — E83.39 OTHER DISORDERS OF PHOSPHORUS METABOLISM: ICD-10-CM

## 2019-06-13 DIAGNOSIS — J18.9 PNEUMONIA, UNSPECIFIED ORGANISM: ICD-10-CM

## 2019-06-13 DIAGNOSIS — I11.0 HYPERTENSIVE HEART DISEASE WITH HEART FAILURE: ICD-10-CM

## 2019-06-13 DIAGNOSIS — I31.3 PERICARDIAL EFFUSION (NONINFLAMMATORY): ICD-10-CM

## 2019-06-13 DIAGNOSIS — R06.02 SHORTNESS OF BREATH: ICD-10-CM

## 2019-06-13 DIAGNOSIS — K59.09 OTHER CONSTIPATION: ICD-10-CM

## 2019-06-13 DIAGNOSIS — E44.0 MODERATE PROTEIN-CALORIE MALNUTRITION: ICD-10-CM

## 2019-06-13 DIAGNOSIS — J98.11 ATELECTASIS: ICD-10-CM

## 2019-06-13 DIAGNOSIS — E87.1 HYPO-OSMOLALITY AND HYPONATREMIA: ICD-10-CM

## 2019-08-19 PROBLEM — Z78.9 OTHER SPECIFIED HEALTH STATUS: Chronic | Status: ACTIVE | Noted: 2019-06-02

## 2019-08-28 ENCOUNTER — APPOINTMENT (OUTPATIENT)
Dept: MAMMOGRAPHY | Facility: CLINIC | Age: 84
End: 2019-08-28
Payer: MEDICARE

## 2019-08-28 PROCEDURE — 77063 BREAST TOMOSYNTHESIS BI: CPT

## 2019-08-28 PROCEDURE — 77067 SCR MAMMO BI INCL CAD: CPT

## 2020-02-07 ENCOUNTER — APPOINTMENT (OUTPATIENT)
Dept: RADIOLOGY | Facility: CLINIC | Age: 85
End: 2020-02-07
Payer: MEDICARE

## 2020-02-07 PROCEDURE — 73130 X-RAY EXAM OF HAND: CPT | Mod: 50

## 2020-05-14 ENCOUNTER — APPOINTMENT (OUTPATIENT)
Dept: ULTRASOUND IMAGING | Facility: CLINIC | Age: 85
End: 2020-05-14
Payer: MEDICARE

## 2020-05-14 PROCEDURE — 76700 US EXAM ABDOM COMPLETE: CPT

## 2020-07-29 NOTE — ED ADULT NURSE NOTE - NS ED NURSE LEVEL OF CONSCIOUSNESS MENTAL STATUS
Chronic stable patient on CPAP machine patient does follow-up with the sleep specialist periodically discussed the patient important lose weight and sleeping position also discussed with the patient Awake/Cooperative/Alert

## 2020-09-08 ENCOUNTER — APPOINTMENT (OUTPATIENT)
Dept: MAMMOGRAPHY | Facility: CLINIC | Age: 85
End: 2020-09-08
Payer: MEDICARE

## 2020-09-08 PROCEDURE — 77063 BREAST TOMOSYNTHESIS BI: CPT

## 2020-09-08 PROCEDURE — 77067 SCR MAMMO BI INCL CAD: CPT

## 2021-01-22 ENCOUNTER — APPOINTMENT (OUTPATIENT)
Dept: CT IMAGING | Facility: CLINIC | Age: 86
End: 2021-01-22
Payer: MEDICARE

## 2021-01-22 PROCEDURE — 82565A: CUSTOM | Mod: QW

## 2021-01-22 PROCEDURE — 74177 CT ABD & PELVIS W/CONTRAST: CPT | Mod: MH

## 2021-01-22 PROCEDURE — Q9967B: CUSTOM

## 2021-06-28 ENCOUNTER — APPOINTMENT (OUTPATIENT)
Dept: RADIOLOGY | Facility: CLINIC | Age: 86
End: 2021-06-28
Payer: MEDICARE

## 2021-06-28 PROCEDURE — 77080 DXA BONE DENSITY AXIAL: CPT

## 2021-09-21 ENCOUNTER — APPOINTMENT (OUTPATIENT)
Dept: CT IMAGING | Facility: CLINIC | Age: 86
End: 2021-09-21
Payer: MEDICARE

## 2021-09-21 PROCEDURE — 82565 ASSAY OF CREATININE: CPT | Mod: QW

## 2021-09-21 PROCEDURE — 71275 CT ANGIOGRAPHY CHEST: CPT | Mod: MH

## 2022-05-06 ENCOUNTER — APPOINTMENT (OUTPATIENT)
Dept: ULTRASOUND IMAGING | Facility: CLINIC | Age: 87
End: 2022-05-06

## 2022-05-06 ENCOUNTER — APPOINTMENT (OUTPATIENT)
Dept: MAMMOGRAPHY | Facility: CLINIC | Age: 87
End: 2022-05-06

## 2022-05-20 ENCOUNTER — APPOINTMENT (OUTPATIENT)
Dept: CT IMAGING | Facility: CLINIC | Age: 87
End: 2022-05-20
Payer: MEDICARE

## 2022-05-20 PROCEDURE — 71275 CT ANGIOGRAPHY CHEST: CPT | Mod: MH

## 2022-06-23 ENCOUNTER — APPOINTMENT (OUTPATIENT)
Dept: CARDIOLOGY | Facility: CLINIC | Age: 87
End: 2022-06-23
Payer: MEDICARE

## 2022-06-23 ENCOUNTER — NON-APPOINTMENT (OUTPATIENT)
Age: 87
End: 2022-06-23

## 2022-06-23 VITALS
DIASTOLIC BLOOD PRESSURE: 82 MMHG | TEMPERATURE: 97.3 F | SYSTOLIC BLOOD PRESSURE: 120 MMHG | OXYGEN SATURATION: 96 % | WEIGHT: 185 LBS | HEIGHT: 69 IN | BODY MASS INDEX: 27.4 KG/M2 | HEART RATE: 89 BPM

## 2022-06-23 DIAGNOSIS — R06.02 SHORTNESS OF BREATH: ICD-10-CM

## 2022-06-23 DIAGNOSIS — I71.2 THORACIC AORTIC ANEURYSM, W/OUT RUPTURE: ICD-10-CM

## 2022-06-23 DIAGNOSIS — R42 DIZZINESS AND GIDDINESS: ICD-10-CM

## 2022-06-23 DIAGNOSIS — Z82.49 FAMILY HISTORY OF ISCHEMIC HEART DISEASE AND OTHER DISEASES OF THE CIRCULATORY SYSTEM: ICD-10-CM

## 2022-06-23 DIAGNOSIS — I10 ESSENTIAL (PRIMARY) HYPERTENSION: ICD-10-CM

## 2022-06-23 PROCEDURE — 99203 OFFICE O/P NEW LOW 30 MIN: CPT

## 2022-06-23 RX ORDER — LEVOTHYROXINE SODIUM 0.03 MG/1
25 TABLET ORAL DAILY
Refills: 0 | Status: ACTIVE | COMMUNITY

## 2022-06-23 RX ORDER — NORTRIPTYLINE HYDROCHLORIDE 10 MG/1
10 CAPSULE ORAL
Qty: 30 | Refills: 1 | Status: DISCONTINUED | COMMUNITY
Start: 2017-06-12 | End: 2022-06-23

## 2022-06-23 NOTE — REASON FOR VISIT
[FreeTextEntry1] : The patient is seen because of hypertension and an aortic aneurysm.  The patient walks with a cane.  The patient has exertional dyspnea for quite some time without change.  She is dyspneic after moderate exertion.  There is no chest discomfort.  She underwent noninvasive testing with Dr. Leger at [facility].  We will be attempting to retrieve the results.  There has been no change recently.  She was told there were no significant abnormalities.\par \par The patient's been followed for an aortic aneurysm in her thoracic aorta for many years.  There has been no change in several years.\par \par Hypertension: The patient has had hypertension diagnosed by Dr. Mendoza.  He suggested beta-blocker therapy however the patient was always normotensive elsewhere and the patient has not been taking any therapy.  Today the patient is normotensive.\par \par She essentially seeks a second opinion with regards to therapy.

## 2022-06-23 NOTE — ASSESSMENT
[FreeTextEntry1] : Hypertension: The patient is not on pharmacologic therapy.  She is normotensive today.  Overall I have recommended continued observation.\par \par Thoracic aortic aneurysm: The patient states been stable for many years.  Continued observation is her best option.  We will [facility] records [place] [facility].

## 2022-11-10 DIAGNOSIS — Z87.39 PERSONAL HISTORY OF OTHER DISEASES OF THE MUSCULOSKELETAL SYSTEM AND CONNECTIVE TISSUE: ICD-10-CM

## 2023-06-19 ENCOUNTER — APPOINTMENT (OUTPATIENT)
Dept: ORTHOPEDIC SURGERY | Facility: CLINIC | Age: 88
End: 2023-06-19
Payer: MEDICARE

## 2023-06-19 VITALS — BODY MASS INDEX: 26.66 KG/M2 | HEIGHT: 69 IN | WEIGHT: 180 LBS

## 2023-06-19 DIAGNOSIS — G62.9 POLYNEUROPATHY, UNSPECIFIED: ICD-10-CM

## 2023-06-19 PROCEDURE — 99204 OFFICE O/P NEW MOD 45 MIN: CPT

## 2023-06-19 PROCEDURE — 72100 X-RAY EXAM L-S SPINE 2/3 VWS: CPT

## 2023-06-19 RX ORDER — IBANDRONATE SODIUM 150 MG/1
150 TABLET ORAL
Qty: 3 | Refills: 1 | Status: DISCONTINUED | COMMUNITY
Start: 2022-11-10 | End: 2023-06-19

## 2023-06-19 RX ORDER — ATORVASTATIN CALCIUM 10 MG/1
10 TABLET, FILM COATED ORAL DAILY
Refills: 0 | Status: DISCONTINUED | COMMUNITY
End: 2023-06-19

## 2023-06-19 NOTE — ASSESSMENT
[FreeTextEntry1] : Patient given prescription for EMG/NCS, follow up after study is completed to discuss results. \par \par Patient given prescription for MRI, follow up after study is completed to discuss results. \par \par Recommend: - NSAID - Heating pad - Muscle relaxer - Core strengthening exercise - Hamstring stretching exercise Patient is given back rehabilitation exercise book.

## 2023-06-19 NOTE — HISTORY OF PRESENT ILLNESS
[Lower back] : lower back [Gradual] : gradual [9] : 9 [Stabbing] : stabbing [Tingling] : tingling [Constant] : constant [Nothing helps with pain getting better] : Nothing helps with pain getting better [Retired] : Work status: retired [de-identified] : Patient presents today with lower back pain ongoing for years with NKI. Previously completed PT and had ESIs. States her pain radiates down bilateral legs, has numbness/tingling. States her feet hurt the most. States she has neuropathy. Admits to taking Advil for pain. Denies recent imaging. Using cane for ambulation.  [] : no [FreeTextEntry7] : down bilateral legs

## 2023-07-02 ENCOUNTER — FORM ENCOUNTER (OUTPATIENT)
Age: 88
End: 2023-07-02

## 2023-07-03 ENCOUNTER — RESULT REVIEW (OUTPATIENT)
Age: 88
End: 2023-07-03

## 2023-07-03 ENCOUNTER — APPOINTMENT (OUTPATIENT)
Dept: MRI IMAGING | Facility: CLINIC | Age: 88
End: 2023-07-03
Payer: MEDICARE

## 2023-07-03 PROCEDURE — 72148 MRI LUMBAR SPINE W/O DYE: CPT | Mod: MH

## 2023-08-18 ENCOUNTER — NON-APPOINTMENT (OUTPATIENT)
Age: 88
End: 2023-08-18

## 2023-09-25 ENCOUNTER — APPOINTMENT (OUTPATIENT)
Dept: ORTHOPEDIC SURGERY | Facility: CLINIC | Age: 88
End: 2023-09-25
Payer: MEDICARE

## 2023-09-25 DIAGNOSIS — M51.36 OTHER INTERVERTEBRAL DISC DEGENERATION, LUMBAR REGION: ICD-10-CM

## 2023-09-25 DIAGNOSIS — M51.37 OTHER INTERVERTEBRAL DISC DEGENERATION, LUMBOSACRAL REGION: ICD-10-CM

## 2023-09-25 DIAGNOSIS — M54.16 RADICULOPATHY, LUMBAR REGION: ICD-10-CM

## 2023-09-25 DIAGNOSIS — M47.817 SPONDYLOSIS W/OUT MYELOPATHY OR RADICULOPATHY, LUMBOSACRAL REGION: ICD-10-CM

## 2023-09-25 DIAGNOSIS — M48.061 SPINAL STENOSIS, LUMBAR REGION WITHOUT NEUROGENIC CLAUDICATION: ICD-10-CM

## 2023-09-25 PROCEDURE — 99214 OFFICE O/P EST MOD 30 MIN: CPT

## 2023-09-25 RX ORDER — METHYLPREDNISOLONE 4 MG/1
4 TABLET ORAL
Qty: 1 | Refills: 0 | Status: ACTIVE | COMMUNITY
Start: 2023-09-25 | End: 1900-01-01

## 2023-10-20 ENCOUNTER — APPOINTMENT (OUTPATIENT)
Dept: CARDIOLOGY | Facility: CLINIC | Age: 88
End: 2023-10-20

## 2023-11-27 ENCOUNTER — APPOINTMENT (OUTPATIENT)
Dept: ORTHOPEDIC SURGERY | Facility: CLINIC | Age: 88
End: 2023-11-27